# Patient Record
Sex: MALE | Race: WHITE | NOT HISPANIC OR LATINO | Employment: FULL TIME | URBAN - METROPOLITAN AREA
[De-identification: names, ages, dates, MRNs, and addresses within clinical notes are randomized per-mention and may not be internally consistent; named-entity substitution may affect disease eponyms.]

---

## 2017-01-17 ENCOUNTER — OFFICE VISIT - HEALTHEAST (OUTPATIENT)
Dept: INTERNAL MEDICINE | Facility: CLINIC | Age: 37
End: 2017-01-17

## 2017-01-17 DIAGNOSIS — J01.90 ACUTE SINUSITIS: ICD-10-CM

## 2017-01-17 DIAGNOSIS — A09 TRAVELER'S DIARRHEA: ICD-10-CM

## 2017-01-17 ASSESSMENT — MIFFLIN-ST. JEOR: SCORE: 1662.99

## 2018-11-08 ENCOUNTER — RECORDS - HEALTHEAST (OUTPATIENT)
Dept: ADMINISTRATIVE | Facility: OTHER | Age: 38
End: 2018-11-08

## 2018-12-21 ENCOUNTER — RECORDS - HEALTHEAST (OUTPATIENT)
Dept: ADMINISTRATIVE | Facility: OTHER | Age: 38
End: 2018-12-21

## 2019-07-22 ENCOUNTER — RECORDS - HEALTHEAST (OUTPATIENT)
Dept: ADMINISTRATIVE | Facility: OTHER | Age: 39
End: 2019-07-22

## 2020-10-08 ENCOUNTER — RECORDS - HEALTHEAST (OUTPATIENT)
Dept: ADMINISTRATIVE | Facility: OTHER | Age: 40
End: 2020-10-08

## 2020-11-17 ENCOUNTER — RECORDS - HEALTHEAST (OUTPATIENT)
Dept: ADMINISTRATIVE | Facility: OTHER | Age: 40
End: 2020-11-17

## 2020-12-10 ENCOUNTER — RECORDS - HEALTHEAST (OUTPATIENT)
Dept: ADMINISTRATIVE | Facility: OTHER | Age: 40
End: 2020-12-10

## 2021-02-02 ENCOUNTER — OFFICE VISIT - HEALTHEAST (OUTPATIENT)
Dept: INTERNAL MEDICINE | Facility: CLINIC | Age: 41
End: 2021-02-02

## 2021-02-02 DIAGNOSIS — Z00.00 ROUTINE GENERAL MEDICAL EXAMINATION AT A HEALTH CARE FACILITY: ICD-10-CM

## 2021-02-02 DIAGNOSIS — Z23 NEED FOR TDAP VACCINATION: ICD-10-CM

## 2021-02-02 DIAGNOSIS — S43.432S SUPERIOR GLENOID LABRUM LESION OF LEFT SHOULDER, SEQUELA: ICD-10-CM

## 2021-02-02 DIAGNOSIS — R79.89 ABNORMAL LFTS (LIVER FUNCTION TESTS): ICD-10-CM

## 2021-02-02 DIAGNOSIS — Z01.818 PREOP GENERAL PHYSICAL EXAM: ICD-10-CM

## 2021-02-02 DIAGNOSIS — K21.9 GASTROESOPHAGEAL REFLUX DISEASE WITHOUT ESOPHAGITIS: ICD-10-CM

## 2021-02-02 LAB
ALBUMIN SERPL-MCNC: 4.5 G/DL (ref 3.5–5)
ALP SERPL-CCNC: 54 U/L (ref 45–120)
ALT SERPL W P-5'-P-CCNC: 108 U/L (ref 0–45)
ANION GAP SERPL CALCULATED.3IONS-SCNC: 7 MMOL/L (ref 5–18)
AST SERPL W P-5'-P-CCNC: 38 U/L (ref 0–40)
BILIRUB SERPL-MCNC: 0.7 MG/DL (ref 0–1)
BUN SERPL-MCNC: 18 MG/DL (ref 8–22)
CALCIUM SERPL-MCNC: 9.5 MG/DL (ref 8.5–10.5)
CHLORIDE BLD-SCNC: 103 MMOL/L (ref 98–107)
CHOLEST SERPL-MCNC: 193 MG/DL
CO2 SERPL-SCNC: 29 MMOL/L (ref 22–31)
CREAT SERPL-MCNC: 1.04 MG/DL (ref 0.7–1.3)
ERYTHROCYTE [DISTWIDTH] IN BLOOD BY AUTOMATED COUNT: 11.4 % (ref 11–14.5)
FASTING STATUS PATIENT QL REPORTED: YES
GFR SERPL CREATININE-BSD FRML MDRD: >60 ML/MIN/1.73M2
GLUCOSE BLD-MCNC: 102 MG/DL (ref 70–125)
HCT VFR BLD AUTO: 46 % (ref 40–54)
HDLC SERPL-MCNC: 43 MG/DL
HGB BLD-MCNC: 16 G/DL (ref 14–18)
LDLC SERPL CALC-MCNC: 124 MG/DL
MCH RBC QN AUTO: 30.4 PG (ref 27–34)
MCHC RBC AUTO-ENTMCNC: 34.8 G/DL (ref 32–36)
MCV RBC AUTO: 88 FL (ref 80–100)
PLATELET # BLD AUTO: 196 THOU/UL (ref 140–440)
PMV BLD AUTO: 10.9 FL (ref 7–10)
POTASSIUM BLD-SCNC: 4.4 MMOL/L (ref 3.5–5)
PROT SERPL-MCNC: 7.5 G/DL (ref 6–8)
RBC # BLD AUTO: 5.26 MILL/UL (ref 4.4–6.2)
SODIUM SERPL-SCNC: 139 MMOL/L (ref 136–145)
TRIGL SERPL-MCNC: 129 MG/DL
WBC: 5.3 THOU/UL (ref 4–11)

## 2021-02-03 ENCOUNTER — COMMUNICATION - HEALTHEAST (OUTPATIENT)
Dept: INTERNAL MEDICINE | Facility: CLINIC | Age: 41
End: 2021-02-03

## 2021-02-16 ENCOUNTER — RECORDS - HEALTHEAST (OUTPATIENT)
Dept: ADMINISTRATIVE | Facility: OTHER | Age: 41
End: 2021-02-16

## 2021-03-02 ENCOUNTER — RECORDS - HEALTHEAST (OUTPATIENT)
Dept: ADMINISTRATIVE | Facility: OTHER | Age: 41
End: 2021-03-02

## 2021-03-30 ENCOUNTER — RECORDS - HEALTHEAST (OUTPATIENT)
Dept: ADMINISTRATIVE | Facility: OTHER | Age: 41
End: 2021-03-30

## 2021-04-21 ENCOUNTER — COMMUNICATION - HEALTHEAST (OUTPATIENT)
Dept: INTERNAL MEDICINE | Facility: CLINIC | Age: 41
End: 2021-04-21

## 2021-04-21 ENCOUNTER — AMBULATORY - HEALTHEAST (OUTPATIENT)
Dept: INTERNAL MEDICINE | Facility: CLINIC | Age: 41
End: 2021-04-21

## 2021-04-21 ENCOUNTER — AMBULATORY - HEALTHEAST (OUTPATIENT)
Dept: LAB | Facility: CLINIC | Age: 41
End: 2021-04-21

## 2021-04-21 DIAGNOSIS — R79.89 ABNORMAL LFTS (LIVER FUNCTION TESTS): ICD-10-CM

## 2021-04-21 LAB — ALT SERPL W P-5'-P-CCNC: 157 U/L (ref 0–45)

## 2021-04-22 ENCOUNTER — AMBULATORY - HEALTHEAST (OUTPATIENT)
Dept: LAB | Facility: CLINIC | Age: 41
End: 2021-04-22

## 2021-04-22 DIAGNOSIS — R79.89 ABNORMAL LFTS (LIVER FUNCTION TESTS): ICD-10-CM

## 2021-04-22 LAB
FERRITIN SERPL-MCNC: 427 NG/ML (ref 27–300)
IRON SATN MFR SERPL: 24 % (ref 20–50)
IRON SERPL-MCNC: 96 UG/DL (ref 42–175)
TIBC SERPL-MCNC: 396 UG/DL (ref 313–563)
TRANSFERRIN SERPL-MCNC: 317 MG/DL (ref 212–360)

## 2021-04-23 ENCOUNTER — COMMUNICATION - HEALTHEAST (OUTPATIENT)
Dept: INTERNAL MEDICINE | Facility: CLINIC | Age: 41
End: 2021-04-23

## 2021-04-23 ENCOUNTER — HOSPITAL ENCOUNTER (OUTPATIENT)
Dept: ULTRASOUND IMAGING | Facility: CLINIC | Age: 41
Discharge: HOME | End: 2021-04-23
Attending: INTERNAL MEDICINE

## 2021-04-23 DIAGNOSIS — R79.89 ABNORMAL LFTS (LIVER FUNCTION TESTS): ICD-10-CM

## 2021-04-23 LAB
HBV SURFACE AG SERPL QL IA: NEGATIVE
HCV AB SERPL QL IA: NEGATIVE

## 2021-05-04 ENCOUNTER — RECORDS - HEALTHEAST (OUTPATIENT)
Dept: ADMINISTRATIVE | Facility: OTHER | Age: 41
End: 2021-05-04

## 2021-05-30 VITALS — HEIGHT: 67 IN | BODY MASS INDEX: 27.62 KG/M2 | WEIGHT: 176 LBS

## 2021-06-05 VITALS
OXYGEN SATURATION: 96 % | WEIGHT: 183.4 LBS | SYSTOLIC BLOOD PRESSURE: 116 MMHG | TEMPERATURE: 98.9 F | HEART RATE: 84 BPM | DIASTOLIC BLOOD PRESSURE: 70 MMHG | BODY MASS INDEX: 28.94 KG/M2

## 2021-06-08 NOTE — PROGRESS NOTES
"  Office Visit - Follow Up   Liang Vázquez   36 y.o. male    Date of Visit: 1/17/2017    Chief Complaint   Patient presents with     Eye Problem     Both Eyes blood shot, felt like something in it, matted shut Monday,      Sinusitis     Head pressure, cough, yellowish phlem, started ~ 6 days ago,      Diarrhea     Was traveling and now has diarrhea        Assessment and Plan   1. Acute sinusitis  He also has associated bilateral pinkeye.  This does not need antibiotic eyedrops.  For his sinusitis will use a Z-Rolando take as directed.  Call if not improving.    2. Traveler's diarrhea  His diarrhea does seem to be resolving and is really quite mild at this time stools are just soft and not watery.  No blood.  No further treatment needed.          No Follow-up on file.     History of Present Illness   This 36 y.o. old arrived in [facility] on 1/8/2017 for a family wedding.  His sister was getting .  He was sick almost immediately upon getting there he had a fever and achiness and chills and sore throat.  The symptoms resolved but then his head became congested in his sinuses hurt.  2 days ago he noted that his right eye was red and watery.  The next morning his right eye was stuck together in his left eye was pink and watery.  Associated with this since he has been back in town over the last few days he has had some loose stools but no profound diarrhea.  No blood in his stool.  No abdominal pains.    Review of Systems: A comprehensive review of systems was negative except as noted.     Medications, Allergies and Problem List   Reviewed and updated     Physical Exam   General Appearance:       Visit Vitals     /62 (Patient Site: Left Arm, Patient Position: Sitting)     Pulse 68     Ht 5' 6.75\" (1.695 m)     Wt 176 lb (79.8 kg)     SpO2 96%     BMI 27.77 kg/m2       He is not jaundiced.  His right conjunctiva is red.  His left conjunctiva is just mildly red.  His maxillary sinuses and frontal sinuses are a " bit tender.  Mouth and throat are negative.  Neck shows no adenopathy.  Lungs are clear.       Additional Information   Current Outpatient Prescriptions   Medication Sig Dispense Refill     omeprazole (PRILOSEC) 20 MG capsule Take 20 mg by mouth daily.        azithromycin (ZITHROMAX) 250 MG tablet Take 2 tablets by mouth day one and 1 tablet by mouth days 2-5 6 tablet 0     No current facility-administered medications for this visit.      No Known Allergies  Social History   Substance Use Topics     Smoking status: Never Smoker     Smokeless tobacco: Never Used     Alcohol use 0.5 - 1.0 oz/week     1 - 2 drink(s) per week       Review and/or order of clinical lab tests:  Review and/or order of radiology tests:  Review and/or order of medicine tests:  Discussion of test results with performing physician:  Decision to obtain old records and/or obtain history from someone other than the patient:  Review and summarization of old records and/or obtaining history from someone other than the patient and.or discussion of case with another health care provider:  Independent visualization of image, tracing or specimen itself:    Time: total time spent with the patient was 15 minutes of which >50% was spent in counseling and coordination of care     Chase Martel MD

## 2021-06-14 NOTE — PATIENT INSTRUCTIONS - HE
Satisfactory preoperative medical exam for this historically very healthy 40-year-old man, who has a SLAP tear and perilabral cyst involving his left shoulder, and will undergo arthroscopic surgery at [facility] February 16, 2021.  They told her that he needs a Covid test within 5 days of the procedure, and [facility] would arrange that.     His only medication has been omeprazole, he has no allergies.  His parents are very healthy, although his dad has high blood pressure.    Since he is fasting this morning and establishing primary care, please include some health maintenance tests.    We will get comprehensive metabolic panel, lipid profile, and blood cell counts.  I will report the results to him via [employer].    He told me that he takes his omeprazole in the morning, and I told him he could take that capsule the morning of his surgery with a small sip of water, but otherwise be nothing by mouth.    We will also give him a routine tetanus booster.

## 2021-06-14 NOTE — PROGRESS NOTES
[facility]  [address] [address]  Dept: [phone]  Dept Fax: [phone]  Primary Provider: Brock Link MD  Pre-op Performing Provider: TRAM BORJA    PREOPERATIVE EVALUATION:  Today's date: 2/2/2021    Liang Vázquez is a 40 y.o. male who presents for a preoperative evaluation.    Surgical Information:  Surgery/Procedure: Left shoulder  Surgery Location: [facility]  Surgeon: Dr. Lauro Hernández  Surgery Date: 2/16/2021  Time of Surgery: Unknown  Where patient plans to recover: At home with family  Fax number for surgical facility:     Type of Anesthesia Anticipated: General    Subjective     HPI related to upcoming procedure:     Last visit to [facility] internal medicine was January 17, 2017, just over 3 years ago.  Thus this would be a New Patient visit.    Issue is a SLAP tear with what is described as a significant sized perilabral cyst left shoulder 10 x 13 mm from the posterior superior labrum, no significant rotator cuff pathology.  He said chronic and recurring flareups of left shoulder pain, November 17, 2020 had MRI arthrogram of left shoulder and did receive a subacromial injection that gave 80 to 90% relief but for less than a month.  Distribution of pain has been through the anterior chest, but as of today February 2, 2021 he is not experiencing that, and reports range of motion is pretty good.  However he may be deliberately reducing use of the shoulder, and that could put him at risk for someday getting a frozen shoulder.    He is historically a very healthy 40-year-old man, has no history of serious illnesses, recalls no previous surgery.  His only medication is omeprazole for typical gastroesophageal reflux symptoms, but he has no trouble swallowing.    Since today's visit is also to establish primary care with me (previously was seeing Dr. Yayo Link), but to take advantage of the fact that he is fasting this morning to get some  health maintenance lab tests, so therefore we will get a comprehensive metabolic panel, blood cell counts, and lipid profile.    We do not need an EKG today.  His blood pressure is excellent, he has no cardiac symptoms or history      Preop Questions 2/2/2021   Have you ever had a heart attack or stroke? No   Have you ever had surgery on your heart or blood vessels, such as a stent placement, a coronary artery bypass, or surgery on an artery in your head, neck, heart, or legs? No   Do you have chest pain with activity? No   Do you have a history of  heart failure? No   Do you currently have a cold, bronchitis or symptoms of other infection? No   Do you have a cough, shortness of breath, or wheezing? No   Do you or anyone in your family have previous history of blood clots? No   Do you or does anyone in your family have a serious bleeding problem such as prolonged bleeding following surgeries or cuts? No   Have you ever had problems with anemia or been told to take iron pills? No   Have you had any abnormal blood loss such as black, tarry or bloody stools? No   Have you ever had a blood transfusion? No   Are you willing to have a blood transfusion if it is medically needed before, during, or after your surgery? Yes   Have you or any of your relatives ever had problems with anesthesia? No   Do you have sleep apnea, excessive snoring or daytime drowsiness? No   Do you have any artifical heart valves or other implanted medical devices like a pacemaker, defibrillator, or continuous glucose monitor? No   Do you have artificial joints? No   Are you allergic to latex? No       Review of Systems  Constitutional, neuro, ENT, endocrine, pulmonary, cardiac, gastrointestinal, genitourinary, musculoskeletal, integument and psychiatric systems are negative, except as otherwise noted.      Patient Active Problem List    Diagnosis Date Noted     Esophageal Reflux      No past medical history on file.  History reviewed. No pertinent  surgical history.  Current Outpatient Medications   Medication Sig Dispense Refill     omeprazole (PRILOSEC) 20 MG capsule Take 20 mg by mouth daily.        No current facility-administered medications for this visit.        No Known Allergies    Social History     Tobacco Use     Smoking status: Never Smoker     Smokeless tobacco: Never Used   Substance Use Topics     Alcohol use: Yes     Alcohol/week: 0.8 - 1.7 standard drinks     Types: 1 - 2 drink(s) per week      Family History   Problem Relation Age of Onset     No Medical Problems Mother      Hypertension Father      Social History     Substance and Sexual Activity   Drug Use No        Objective     /70 (Patient Site: Right Arm, Patient Position: Sitting, Cuff Size: Adult Large)   Pulse 84   Temp 98.9  F (37.2  C) (Oral)   Wt 183 lb 6.4 oz (83.2 kg)   SpO2 96%   BMI 28.94 kg/m    Physical Exam    General: Alert, in no distress  Skin: + Scattered benign-appearing moles, especially on his back, also acne on his back.  Large skin tag on his right upper biceps area, which he says has been present for years.  Eyes/nose/throat: Eyes without scleral icterus, eye movements normal, pupils equal and reactive, oropharynx clear  MSK: Neck with good ROM  Lymphatic: Neck without adenopathy or masses  Endocrine: Thyroid with no nodules to palpation  Pulm: Lungs clear to auscultation bilaterally  Cardiac: Heart with regular rate and rhythm, no murmur or gallop  GI: Abdomen soft, nontender. No palpable enlargement of liver or spleen  MSK: Extremities no tenderness or edema  Good range of motion of both shoulders  Neuro: Moves all extremities, w skilled ithout focal weakness  Psych: Alert, normal mental status. Normal affect and speech    No results for input(s): HGB, PLT, INR, NA, K, CREATININE, HBA1C in the last [id number] hours.     PRE-OP Diagnostics:   Recent Results (from the past 48 hour(s))   Comprehensive Metabolic Panel    Collection Time: 02/02/21  8:38 AM    Result Value Ref Range    Sodium 139 136 - 145 mmol/L    Potassium 4.4 3.5 - 5.0 mmol/L    Chloride 103 98 - 107 mmol/L    CO2 29 22 - 31 mmol/L    Anion Gap, Calculation 7 5 - 18 mmol/L    Glucose 102 70 - 125 mg/dL    BUN 18 8 - 22 mg/dL    Creatinine 1.04 0.70 - 1.30 mg/dL    GFR MDRD Af Amer >60 >60 mL/min/1.73m2    GFR MDRD Non Af Amer >60 >60 mL/min/1.73m2    Bilirubin, Total 0.7 0.0 - 1.0 mg/dL    Calcium 9.5 8.5 - 10.5 mg/dL    Protein, Total 7.5 6.0 - 8.0 g/dL    Albumin 4.5 3.5 - 5.0 g/dL    Alkaline Phosphatase 54 45 - 120 U/L    AST 38 0 - 40 U/L     (H) 0 - 45 U/L   HM2(CBC w/o Differential)    Collection Time: 02/02/21  8:38 AM   Result Value Ref Range    WBC 5.3 4.0 - 11.0 thou/uL    RBC 5.26 4.40 - 6.20 mill/uL    Hemoglobin 16.0 14.0 - 18.0 g/dL    Hematocrit 46.0 40.0 - 54.0 %    MCV 88 80 - 100 fL    MCH 30.4 27.0 - 34.0 pg    MCHC 34.8 32.0 - 36.0 g/dL    RDW 11.4 11.0 - 14.5 %    Platelets 196 140 - 440 thou/uL    MPV 10.9 (H) 7.0 - 10.0 fL   Lipid Cascade    Collection Time: 02/02/21  8:38 AM   Result Value Ref Range    Cholesterol 193 <=199 mg/dL    Triglycerides 129 <=149 mg/dL    HDL Cholesterol 43 >=40 mg/dL    LDL Calculated 124 <=129 mg/dL    Patient Fasting > 8hrs? Yes      No EKG required, no history of coronary heart disease, significant arrhythmia, peripheral arterial disease or other structural heart disease.    REVISED CARDIAC RISK INDEX (RCRI)   The patient has the following serious cardiovascular risks for perioperative complications:   - No serious cardiac risks = 0 points    RCRI INTERPRETATION: 0 points: Class I (very low risk - 0.4% complication rate)         Assessment & Plan      The proposed surgical procedure is considered LOW risk.      Satisfactory preoperative medical exam for this historically very healthy 40-year-old man, who has a SLAP tear and perilabral cyst involving his left shoulder, and will undergo arthroscopic surgery at [facility] February  16, 2021.  They told her that he needs a Covid test within 5 days of the procedure, and [facility] would arrange that.     His only medication has been omeprazole, he has no allergies.  His parents are very healthy, although his dad has high blood pressure.    Since he is fasting this morning and establishing primary care, please include some health maintenance tests.    We will get comprehensive metabolic panel, lipid profile, and blood cell counts.  I will report the results to him via Q-got.    LATER: Preop labs are satisfactory, although there is an issue of  Elevated ALT of 108, normal AST, alkaline phosphatase, and bilirubin.  I will inform him via MyChart Note to recheck this in 3 months, but reduce alcohol, try to lose a few pounds    CBC within normal limits.  Lipid profile reasonably good.    He told me that he takes his omeprazole in the morning, and I told him he could take that capsule the morning of his surgery with a small sip of water, but otherwise be nothing by mouth.    We will also give him a routine tetanus booster.    Acne and benign-appearing moles on the back, skin tag right biceps area      RECOMMENDATION:  APPROVAL GIVEN to proceed with proposed procedure, without further diagnostic evaluation.    Signed Electronically by: Merlin Delatorre MD    Copy of this evaluation report is provided to requesting physician.    [facility] Pre[facility] Guidelines    Revised Cardiac Risk Index

## 2021-06-16 PROBLEM — S43.439A SLAP TEAR OF SHOULDER: Status: ACTIVE | Noted: 2021-02-02

## 2021-06-16 PROBLEM — K76.0 FATTY LIVER: Status: ACTIVE | Noted: 2021-04-23

## 2021-06-16 PROBLEM — R79.89 ABNORMAL LFTS (LIVER FUNCTION TESTS): Status: ACTIVE | Noted: 2021-02-03

## 2021-06-26 ENCOUNTER — HEALTH MAINTENANCE LETTER (OUTPATIENT)
Age: 41
End: 2021-06-26

## 2021-07-20 ENCOUNTER — TRANSFERRED RECORDS (OUTPATIENT)
Dept: HEALTH INFORMATION MANAGEMENT | Facility: CLINIC | Age: 41
End: 2021-07-20

## 2021-08-03 ENCOUNTER — TRANSFERRED RECORDS (OUTPATIENT)
Dept: HEALTH INFORMATION MANAGEMENT | Facility: CLINIC | Age: 41
End: 2021-08-03

## 2021-10-16 ENCOUNTER — HEALTH MAINTENANCE LETTER (OUTPATIENT)
Age: 41
End: 2021-10-16

## 2021-10-20 ENCOUNTER — LAB (OUTPATIENT)
Dept: LAB | Facility: CLINIC | Age: 41
End: 2021-10-20
Payer: COMMERCIAL

## 2021-10-20 DIAGNOSIS — R94.5 ABNORMAL RESULTS OF LIVER FUNCTION STUDIES: ICD-10-CM

## 2021-10-20 LAB
ALBUMIN SERPL-MCNC: 4.5 G/DL (ref 3.5–5)
ALP SERPL-CCNC: 57 U/L (ref 45–120)
ALT SERPL W P-5'-P-CCNC: 41 U/L (ref 0–45)
ANION GAP SERPL CALCULATED.3IONS-SCNC: 11 MMOL/L (ref 5–18)
AST SERPL W P-5'-P-CCNC: 20 U/L (ref 0–40)
BILIRUB SERPL-MCNC: 0.8 MG/DL (ref 0–1)
BUN SERPL-MCNC: 20 MG/DL (ref 8–22)
CALCIUM SERPL-MCNC: 10 MG/DL (ref 8.5–10.5)
CHLORIDE BLD-SCNC: 104 MMOL/L (ref 98–107)
CO2 SERPL-SCNC: 27 MMOL/L (ref 22–31)
CREAT SERPL-MCNC: 0.95 MG/DL (ref 0.7–1.3)
FERRITIN SERPL-MCNC: 135 NG/ML (ref 27–300)
GFR SERPL CREATININE-BSD FRML MDRD: >90 ML/MIN/1.73M2
GLUCOSE BLD-MCNC: 94 MG/DL (ref 70–125)
POTASSIUM BLD-SCNC: 4.9 MMOL/L (ref 3.5–5)
PROT SERPL-MCNC: 7.5 G/DL (ref 6–8)
SODIUM SERPL-SCNC: 142 MMOL/L (ref 136–145)

## 2021-10-20 PROCEDURE — 36415 COLL VENOUS BLD VENIPUNCTURE: CPT

## 2021-10-20 PROCEDURE — 80053 COMPREHEN METABOLIC PANEL: CPT

## 2021-10-20 PROCEDURE — 82728 ASSAY OF FERRITIN: CPT

## 2022-07-23 ENCOUNTER — HEALTH MAINTENANCE LETTER (OUTPATIENT)
Age: 42
End: 2022-07-23

## 2022-09-20 ENCOUNTER — TRANSFERRED RECORDS (OUTPATIENT)
Dept: HEALTH INFORMATION MANAGEMENT | Facility: CLINIC | Age: 42
End: 2022-09-20

## 2022-10-01 ENCOUNTER — HEALTH MAINTENANCE LETTER (OUTPATIENT)
Age: 42
End: 2022-10-01

## 2022-10-26 ENCOUNTER — OFFICE VISIT (OUTPATIENT)
Dept: INTERNAL MEDICINE | Facility: CLINIC | Age: 42
End: 2022-10-26
Payer: COMMERCIAL

## 2022-10-26 VITALS
OXYGEN SATURATION: 96 % | DIASTOLIC BLOOD PRESSURE: 82 MMHG | HEART RATE: 82 BPM | SYSTOLIC BLOOD PRESSURE: 110 MMHG | BODY MASS INDEX: 27.62 KG/M2 | HEIGHT: 67 IN | WEIGHT: 176 LBS

## 2022-10-26 DIAGNOSIS — Z13.220 SCREENING FOR HYPERLIPIDEMIA: ICD-10-CM

## 2022-10-26 DIAGNOSIS — Z23 HIGH PRIORITY FOR COVID-19 VACCINATION: ICD-10-CM

## 2022-10-26 DIAGNOSIS — Z23 INFLUENZA VACCINATION ADMINISTERED AT CURRENT VISIT: ICD-10-CM

## 2022-10-26 DIAGNOSIS — Z00.00 ROUTINE GENERAL MEDICAL EXAMINATION AT A HEALTH CARE FACILITY: Primary | ICD-10-CM

## 2022-10-26 DIAGNOSIS — Z13.1 SCREENING FOR DIABETES MELLITUS: ICD-10-CM

## 2022-10-26 PROCEDURE — 99396 PREV VISIT EST AGE 40-64: CPT | Mod: 25 | Performed by: INTERNAL MEDICINE

## 2022-10-26 PROCEDURE — 0124A COVID-19,PF,PFIZER BOOSTER BIVALENT: CPT | Performed by: INTERNAL MEDICINE

## 2022-10-26 PROCEDURE — 90471 IMMUNIZATION ADMIN: CPT | Performed by: INTERNAL MEDICINE

## 2022-10-26 PROCEDURE — 90686 IIV4 VACC NO PRSV 0.5 ML IM: CPT | Performed by: INTERNAL MEDICINE

## 2022-10-26 PROCEDURE — 91312 COVID-19,PF,PFIZER BOOSTER BIVALENT: CPT | Performed by: INTERNAL MEDICINE

## 2022-10-26 ASSESSMENT — ENCOUNTER SYMPTOMS
DIZZINESS: 0
DIARRHEA: 0
SHORTNESS OF BREATH: 0
HEMATURIA: 0
HEADACHES: 0
COUGH: 0
SORE THROAT: 0
CHILLS: 0
ABDOMINAL PAIN: 0
MYALGIAS: 0
HEMATOCHEZIA: 0
WEAKNESS: 0
PALPITATIONS: 0
NAUSEA: 0
JOINT SWELLING: 0
FEVER: 0
FREQUENCY: 0
EYE PAIN: 0
HEARTBURN: 0
PARESTHESIAS: 0
CONSTIPATION: 0
DYSURIA: 0
NERVOUS/ANXIOUS: 0
ARTHRALGIAS: 0

## 2022-10-26 NOTE — PROGRESS NOTES
SUBJECTIVE:   CC: Esteban is an 42 year old who presents for preventative health visit.       Patient has been advised of split billing requirements and indicates understanding: Yes  Healthy Habits:     Getting at least 3 servings of Calcium per day:  NO    Bi-annual eye exam:  NO    Dental care twice a year:  Yes    Sleep apnea or symptoms of sleep apnea:  None    Diet:  Regular (no restrictions)    Frequency of exercise:  None    Taking medications regularly:  Yes    Medication side effects:  None    PHQ-2 Total Score: 0    Additional concerns today:  No      Today's PHQ-2 Score:   PHQ-2 ( 1999 Pfizer) 10/26/2022   Q1: Little interest or pleasure in doing things 0   Q2: Feeling down, depressed or hopeless 0   PHQ-2 Score 0   Q1: Little interest or pleasure in doing things Not at all   Q2: Feeling down, depressed or hopeless Not at all   PHQ-2 Score 0       Abuse: Current or Past(Physical, Sexual or Emotional)- NA  Do you feel safe in your environment? Yes    Have you ever done Advance Care Planning? (For example, a Health Directive, POLST, or a discussion with a medical provider or your loved ones about your wishes): No, advance care planning information given to patient to review.  Patient declined advance care planning discussion at this time.    Social History     Tobacco Use     Smoking status: Never     Smokeless tobacco: Never   Substance Use Topics     Alcohol use: Yes     Alcohol/week: 0.8 - 1.7 standard drinks         Alcohol Use 10/26/2022   Prescreen: >3 drinks/day or >7 drinks/week? No       Last PSA: No results found for: PSA    Reviewed orders with patient. Reviewed health maintenance and updated orders accordingly -       Reviewed and updated as needed this visit by clinical staff   Tobacco  Allergies  Meds              Reviewed and updated as needed this visit by Provider                     Review of Systems   Constitutional: Negative for chills and fever.   HENT: Negative for congestion, ear  "pain, hearing loss and sore throat.    Eyes: Negative for pain and visual disturbance.   Respiratory: Negative for cough and shortness of breath.    Cardiovascular: Negative for chest pain, palpitations and peripheral edema.   Gastrointestinal: Negative for abdominal pain, constipation, diarrhea, heartburn, hematochezia and nausea.   Genitourinary: Negative for dysuria, frequency, genital sores, hematuria, impotence, penile discharge and urgency.   Musculoskeletal: Negative for arthralgias, joint swelling and myalgias.   Skin: Negative for rash.   Neurological: Negative for dizziness, weakness, headaches and paresthesias.   Psychiatric/Behavioral: Negative for mood changes. The patient is not nervous/anxious.      OBJECTIVE:   /82   Pulse 82   Ht 1.695 m (5' 6.75\")   Wt 79.8 kg (176 lb)   SpO2 96%   BMI 27.77 kg/m      Physical Exam    General: Alert, in no distress  Skin: + Scattered benign-appearing moles on his back, scattered acne pustules.  Eyes/nose/throat: Eyes without scleral icterus, eye movements normal, pupils equal and reactive, oropharynx clear, ears with normal TM's  MSK: Neck with good ROM  Lymphatic: Neck without adenopathy or masses  Endocrine: Thyroid with no nodules to palpation  Pulm: Lungs clear to auscultation bilaterally  Cardiac: Heart with regular rate and rhythm, no murmur or gallop  GI: Abdomen soft, nontender. No palpable enlargement of liver or spleen  MSK: Extremities no tenderness or edema  Neuro: Moves all extremities, without focal weakness  Psych: Alert, normal mental status. Normal affect and speech    ASSESSMENT/PLAN:     Annual preventive exam, my last visit with Esteban was February 2021, and has been doing well.    He was pleased to report that his left shoulder is doing really well these days, and he can do all of his work and recreational activities, and would like to get back to the gym.    He is going to come in for fasting blood test at a future morning, at which " time check his lipid panel, comprehensive metabolic panel which includes liver chemistry test, blood cell counts, A1c test for diabetes  Today October 26, 2022 we will give him Pfizer bivalent booster and also seasonal flu shot    At age 42 probably does not need a PSA prostate cancer blood screening test.    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  I reminded him that the omeprazole does not actually stop the mechanism of reflux, it merely shifts the stomach contents pH to be less acidic.  I reminded him of the importance of being sure the stomach is pretty much empty before he lies down.  Therefore leave approximately 90 minutes, probably longer, after meal before lying down    History of a elevated ALT liver enzyme in February 2021, which then subsequently normalized, and we will check that again on upcoming metabolic panel.  He told me that alcohol consumption is approximately couple drinks a week.    Overweight by body mass index criteria, 27.8, ideal would be more like 25  I reminded Esteban importance of eating slowly, controlling portion size, and identifying problem foods to reduce or even eliminate such as starches, sweets, and fried foods.  Alcoholic beverages tend to bring carbohydrate calories  Wt Readings from Last 5 Encounters:   10/26/22 79.8 kg (176 lb)   02/02/21 83.2 kg (183 lb 6.4 oz)   01/17/17 79.8 kg (176 lb)   12/27/16 78.5 kg (173 lb)   02/03/16 80.8 kg (178 lb 1.3 oz)     Good blood pressure  BP Readings from Last 6 Encounters:   10/26/22 110/82   02/02/21 116/70     Lipid panel in February 2021 sort just okay, because his HDL good cholesterol is a bit on the low side at 43, would like to see greater than  "50    2-2-2021  Cholesterol <=199 mg/dL 193      Triglycerides <=149 mg/dL 129    Direct Measure HDL >=40 mg/dL 43    LDL Cholesterol Calculated <=129 mg/dL 124      Acne and benign-appearing moles on the back, skin tag right biceps area    Future preventive screening tests  I told Esteban that at age 45, could consider starting prostate cancer screening with annual PSA blood tests  Colon cancer screening could start at age 45.  Options include stool based test such as Cologuard, or using colonoscopy.    Has avoided COVID illness, as far as he knows  Would like a Pfizer booster and flu shot    Immunization History   Administered Date(s) Administered     COVID-19,PF,Pfizer (12+ Yrs) 03/18/2021, 04/08/2021     Tdap (Adacel,Boostrix) 02/02/2021       COUNSELING:   Reviewed preventive health counseling, as reflected in patient instructions       Healthy diet/nutrition    Estimated body mass index is 27.77 kg/m  as calculated from the following:    Height as of this encounter: 1.695 m (5' 6.75\").    Weight as of this encounter: 79.8 kg (176 lb).         He reports that he has never smoked. He has never used smokeless tobacco.        TRAM BORJA MD  [facility]  "

## 2022-10-26 NOTE — PATIENT INSTRUCTIONS
Annual preventive exam, my last visit with Esteban was February 2021, and has been doing well.    He was pleased to report that his left shoulder is doing really well these days, and he can do all of his work and recreational activities, and would like to get back to the gym.    He is going to come in for fasting blood test at a future morning, at which time check his lipid panel, comprehensive metabolic panel which includes liver chemistry test, blood cell counts, A1c test for diabetes  Today October 26, 2022 we will give him Pfizer bivalent booster and also seasonal flu shot    At age 42 probably does not need a PSA prostate cancer blood screening test.    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  I reminded him that the omeprazole does not actually stop the mechanism of reflux, it merely shifts the stomach contents pH to be less acidic.  I reminded him of the importance of being sure the stomach is pretty much empty before he lies down.  Therefore leave approximately 90 minutes, probably longer, after meal before lying down    History of a elevated ALT liver enzyme in February 2021, which then subsequently normalized, and we will check that again on upcoming metabolic panel.  He told me that alcohol consumption is approximately couple drinks a week.    Overweight by body mass index criteria, 27.8, ideal would be more like 25  I reminded Esteban importance of eating slowly, controlling portion size, and identifying problem foods to reduce or even eliminate such as starches, sweets, and fried foods.  Alcoholic beverages tend to bring carbohydrate calories  Wt Readings from Last 5 Encounters:   10/26/22 79.8 kg (176 lb)   02/02/21 83.2 kg (183 lb 6.4 oz)    01/17/17 79.8 kg (176 lb)   12/27/16 78.5 kg (173 lb)   02/03/16 80.8 kg (178 lb 1.3 oz)     Good blood pressure  BP Readings from Last 6 Encounters:   10/26/22 110/82   02/02/21 116/70     Lipid panel in February 2021 sort just okay, because his HDL good cholesterol is a bit on the low side at 43, would like to see greater than 50    2-2-2021  Cholesterol <=199 mg/dL 193      Triglycerides <=149 mg/dL 129    Direct Measure HDL >=40 mg/dL 43    LDL Cholesterol Calculated <=129 mg/dL 124      Acne and benign-appearing moles on the back, skin tag right biceps area    Future preventive screening tests  I told Esteban that at age 45, could consider starting prostate cancer screening with annual PSA blood tests  Colon cancer screening could start at age 45.  Options include stool based test such as Cologuard, or using colonoscopy.    Has avoided COVID illness, as far as he knows  Would like a Pfizer booster and flu shot      Immunization History   Administered Date(s) Administered    COVID-19,PF,Pfizer (12+ Yrs) 03/18/2021, 04/08/2021    Tdap (Adacel,Boostrix) 02/02/2021

## 2022-11-03 ENCOUNTER — LAB (OUTPATIENT)
Dept: LAB | Facility: CLINIC | Age: 42
End: 2022-11-03
Payer: COMMERCIAL

## 2022-11-03 DIAGNOSIS — Z13.220 SCREENING FOR HYPERLIPIDEMIA: ICD-10-CM

## 2022-11-03 DIAGNOSIS — Z00.00 ROUTINE GENERAL MEDICAL EXAMINATION AT A HEALTH CARE FACILITY: ICD-10-CM

## 2022-11-03 DIAGNOSIS — Z13.1 SCREENING FOR DIABETES MELLITUS: ICD-10-CM

## 2022-11-03 LAB
ALBUMIN SERPL BCG-MCNC: 4.8 G/DL (ref 3.5–5.2)
ALP SERPL-CCNC: 56 U/L (ref 40–129)
ALT SERPL W P-5'-P-CCNC: 59 U/L (ref 10–50)
ANION GAP SERPL CALCULATED.3IONS-SCNC: 12 MMOL/L (ref 7–15)
AST SERPL W P-5'-P-CCNC: 38 U/L (ref 10–50)
BILIRUB SERPL-MCNC: 0.6 MG/DL
BUN SERPL-MCNC: 26 MG/DL (ref 6–20)
CALCIUM SERPL-MCNC: 9.5 MG/DL (ref 8.6–10)
CHLORIDE SERPL-SCNC: 101 MMOL/L (ref 98–107)
CHOLEST SERPL-MCNC: 207 MG/DL
CREAT SERPL-MCNC: 1.1 MG/DL (ref 0.67–1.17)
DEPRECATED HCO3 PLAS-SCNC: 25 MMOL/L (ref 22–29)
ERYTHROCYTE [DISTWIDTH] IN BLOOD BY AUTOMATED COUNT: 11.6 % (ref 10–15)
GFR SERPL CREATININE-BSD FRML MDRD: 86 ML/MIN/1.73M2
GLUCOSE SERPL-MCNC: 97 MG/DL (ref 70–99)
HBA1C MFR BLD: 5.2 % (ref 0–5.6)
HCT VFR BLD AUTO: 47.8 % (ref 40–53)
HDLC SERPL-MCNC: 41 MG/DL
HGB BLD-MCNC: 16.7 G/DL (ref 13.3–17.7)
LDLC SERPL CALC-MCNC: 134 MG/DL
MCH RBC QN AUTO: 30.6 PG (ref 26.5–33)
MCHC RBC AUTO-ENTMCNC: 34.9 G/DL (ref 31.5–36.5)
MCV RBC AUTO: 88 FL (ref 78–100)
NONHDLC SERPL-MCNC: 166 MG/DL
PLATELET # BLD AUTO: 215 10E3/UL (ref 150–450)
POTASSIUM SERPL-SCNC: 4.1 MMOL/L (ref 3.4–5.3)
PROT SERPL-MCNC: 7.9 G/DL (ref 6.4–8.3)
RBC # BLD AUTO: 5.45 10E6/UL (ref 4.4–5.9)
SODIUM SERPL-SCNC: 138 MMOL/L (ref 136–145)
TRIGL SERPL-MCNC: 159 MG/DL
WBC # BLD AUTO: 6.2 10E3/UL (ref 4–11)

## 2022-11-03 PROCEDURE — 83036 HEMOGLOBIN GLYCOSYLATED A1C: CPT

## 2022-11-03 PROCEDURE — 36415 COLL VENOUS BLD VENIPUNCTURE: CPT

## 2022-11-03 PROCEDURE — 85027 COMPLETE CBC AUTOMATED: CPT

## 2022-11-03 PROCEDURE — 80053 COMPREHEN METABOLIC PANEL: CPT

## 2022-11-03 PROCEDURE — 80061 LIPID PANEL: CPT

## 2023-05-30 ENCOUNTER — OFFICE VISIT (OUTPATIENT)
Dept: PODIATRY | Facility: CLINIC | Age: 43
End: 2023-05-30
Payer: COMMERCIAL

## 2023-05-30 VITALS
SYSTOLIC BLOOD PRESSURE: 121 MMHG | HEART RATE: 77 BPM | OXYGEN SATURATION: 98 % | WEIGHT: 173 LBS | BODY MASS INDEX: 27.3 KG/M2 | DIASTOLIC BLOOD PRESSURE: 64 MMHG

## 2023-05-30 DIAGNOSIS — G57.62 MORTON'S NEUROMA OF LEFT FOOT: Primary | ICD-10-CM

## 2023-05-30 PROCEDURE — 99204 OFFICE O/P NEW MOD 45 MIN: CPT | Performed by: PODIATRIST

## 2023-05-30 NOTE — PROGRESS NOTES
FOOT AND ANKLE SURGERY/PODIATRY CONSULT NOTE         ASSESSMENT:   Bennett's neuroma left foot      TREATMENT:  I have recommended surgical excision of the symptomatic Bennett's neuroma left foot.  The patient was informed that the procedure will be done on an outpatient basis under local anesthesia with IV sedation.  He was told the procedure takes approximately 10 minutes to perform.  He will then be discharged weightbearing in a postop shoe for 2 weeks.  The patient was asked to go n.p.o. at midnight prior to the procedure and he was asked to see his primary care physician for preoperative consultation.  The patient was told he would have some permanent numbness between the third and fourth toes as a result of this procedure.  All pertinent questions were invited and answered.        HPI:Liang Vázquez presented to the clinic today complaining of severe pain involving the fourth toe left foot.  The patient indicated that he has had this pain for several weeks.  The patient stated the pain is aggravated with weightbearing and ambulation.  He continues to have some pain even while resting.  The pain is quite severe.  He denies trauma to the foot.  He has not had any associated redness or swelling.  He denies any other previous treatment.     History reviewed. No pertinent past medical history.    Social History     Socioeconomic History     Marital status:      Spouse name: Not on file     Number of children: Not on file     Years of education: Not on file     Highest education level: Not on file   Occupational History     Not on file   Tobacco Use     Smoking status: Never     Smokeless tobacco: Never   Vaping Use     Vaping status: Not on file   Substance and Sexual Activity     Alcohol use: Yes     Alcohol/week: 0.8 - 1.7 standard drinks of alcohol     Drug use: No     Sexual activity: Not on file   Other Topics Concern     Not on file   Social History Narrative     Not on file     Social Determinants of  Health     Financial Resource Strain: Not on file   Food Insecurity: Not on file   Transportation Needs: Not on file   Physical Activity: Not on file   Stress: Not on file   Social Connections: Not on file   Intimate Partner Violence: Not on file   Housing Stability: Not on file        No Known Allergies       Current Outpatient Medications:      omeprazole (PRILOSEC) 20 MG DR capsule, Take 1 capsule (20 mg) by mouth daily, Disp: 90 capsule, Rfl: 3     Family History   Problem Relation Age of Onset     No Known Problems Mother      Hypertension Father         Social History     Socioeconomic History     Marital status:      Spouse name: Not on file     Number of children: Not on file     Years of education: Not on file     Highest education level: Not on file   Occupational History     Not on file   Tobacco Use     Smoking status: Never     Smokeless tobacco: Never   Vaping Use     Vaping status: Not on file   Substance and Sexual Activity     Alcohol use: Yes     Alcohol/week: 0.8 - 1.7 standard drinks of alcohol     Drug use: No     Sexual activity: Not on file   Other Topics Concern     Not on file   Social History Narrative     Not on file     Social Determinants of Health     Financial Resource Strain: Not on file   Food Insecurity: Not on file   Transportation Needs: Not on file   Physical Activity: Not on file   Stress: Not on file   Social Connections: Not on file   Intimate Partner Violence: Not on file   Housing Stability: Not on file        Review of Systems - Patient denies fever, chills, rash, wound, stiffness, limping, numbness, weakness, heart burn, blood in stool, chest pain with activity, calf pain when walking, shortness of breath with activity, chronic cough, easy bleeding/bruising, swelling of ankles, excessive thirst, fatigue, depression, anxiety.  Patient admits to painful fourth toe left foot.      OBJECTIVE:  Appearance: alert, well appearing, and in no distress.    /64   Pulse  77   Wt 78.5 kg (173 lb)   SpO2 98%   BMI 27.30 kg/m       Body mass index is 27.3 kg/m .     General appearance: Patient is alert and fully cooperative with history & exam.  No sign of distress is noted during the visit.  Psychiatric: Affect is pleasant & appropriate.  Patient appears motivated to improve health.  Respiratory: Breathing is regular & unlabored while sitting.  HEENT: Hearing is intact to spoken word.  Speech is clear.  No gross evidence of visual impairment that would impact ambulation.    Vascular: Dorsalis pedis and posterior tibial pulses are palpable. There is pedal hair growth bilaterally.  CFT < 3 sec from anterior tibial surface to distal digits bilaterally. There is no appreciable edema noted.  Dermatologic: Turgor and texture are within normal limits. No coloration or temperature changes. No primary or secondary lesions noted.  Neurologic: All epicritic and proprioceptive sensations are grossly intact bilaterally.  There is a very pronounced positive Yong sign noted third intermetatarsal space left foot.  Musculoskeletal: All active and passive ankle, subtalar, midtarsal, and 1st MPJ range of motion are grossly intact without pain or crepitus, with the exception of none. Manual muscle strength is within normal limits bilaterally. All dorsiflexors, plantarflexors, invertors, evertors are intact bilaterally. Tenderness present to third intermetatarsal space left foot on palpation.  No tenderness to the left foot with range of motion. Calf is soft/non-tender without warmth/induration    Imaging:       No images are attached to the encounter or orders placed in the encounter.     No results found.   No results found.         Andrea Langley DPM  [facility] Foot & Ankle Surgery/Podiatry

## 2023-05-30 NOTE — PATIENT INSTRUCTIONS
Liang,      Your surgery with [facility] has been ordered. A  will contact you in the next few days to schedule. Or feel free to call [phone] to schedule sooner.     Procedure:   Excision  Bennett's neuroma left foot.     Procedure Date :   A surgery nurse will call you 1-2 days before surgery to inform you of the time of arrival for surgery.    Surgeon:   MD Andrea Argueta    Admission Type:   Outpatient    Procedure Location:   [facility]:  [address] [place] [address] [address] [place] [address] (Fax: [phone])    Post Operative Appointment:      Preparation Instructions to complete:    []  You will need a Pre-op Physical within 30 days before surgery with your primary care provider. This exam is required by the anesthesiologist to ensure a safe surgical experience.    Failure  to obtain your pre-op physical will lead to cancellation of your surgery  Call them right away to schedule this. Please ensure your Preoperative Physical is faxed to the Hospital (numbers listed above)    [] Preoperative Medication Instructions  We would like you to stop your anticoagulation medications 3-5 days before surgery HOWEVER contact your prescribing provider for instructions before discontinuing any medications. (Examples: Coumadin, Plavix, Xarelto, Eliquis, Pradaxa, Effient, Brilinta) If you are on Coumadin, we would like the goal INR ? 1.2.  IT IS OK TO STAY ON ASPIRIN PRIOR TO SURGERY.   Hold Ibuprofen, Herbal Supplements and Vitamin E 7 days before  Stop Cialis, Levitra and Viagra 2-3 days before surgery    [] Fasting Requirements  Nothing to eat for 8 hours before surgery unless instructed differently by the surgery nurse.  You may have clear liquids up to two hours before your arrival time (coffee, tea, water, or Gatorade. No cream or milk)  If your primary care provider has instructed you to continue oral medications, you may take them on the morning of surgery with a  small sip of water.    No alcohol or smoking after 12:00am the day of surgery    [] Contact your insurance regarding coverage  If you would like a Good [place] Estimate for your upcoming procedure at [facility] Location, contact Cost of Care Estimates   Advocates are available Monday through Friday 8am - 5pm   [phone]  You may also submit a request online through [employer]    For all self-pay, estimate, or anesthesia billing questions at [facility], the contact information is below:    Who to contact: Oneyda HARTLEY  [facility]hesia Network number: [phone]  Prepay number: [phone]    [] DO NOT BRING FMLA WITH TO SURGERY.  These should be sent to the provider's office by fax to [phone].    [] Day of Surgery  Medications - Take as indicated with sips of water.   Wear comfortable loose fitting clothes. Wear your glasses-Not contacts. Do not wear jewelry and remove body piercing's. Surgery may be cancelled if they are not removed.   You may have 1 family member wait in the lobby during your surgery. Visitor restrictions are subject to change. Please verify with the surgery nurse when they call.   If same day surgery-Have a someone come with you to surgery that can help you understand the surgeon's instructions, drive you home and stay with you overnight the first night.    [] You will receive a call from a surgery nurse 1-3 days prior to surgery. They will go over more details with you.       Frequently Asked Questions    WHAT DO I DO WHEN I COME HOME FROM SURGERY?    After surgery and/ or your hospital stay you may be tired and drowsy. Rest, but make sure to get up and walk around every couple of hours to circulate your blood. If you are non-weight bearing do not put any weight on your foot.  Be sure to take your medications as directed. Try to get a restful sleep and begin more normal activities as tolerated.    HOW MUCH PAIN SHOULD I EXPECT AND WILL I HAVE PAIN  MEDICATION?    Everyone tolerates pain differently. Still, each type of surgery involves a certain level and type of pain. Generally most people feel better within a few days but keep in mind that everyone has a different tolerance to pain. All medications should be taken as directed. All medications can have side effects such as bleeding, upset stomach, headaches, dizziness, constipation, etc. If you have any major problems or allergic reaction, stop the medication and call the office. If you only have a little pain, you may simply take Tylenol or Ibuprofen as directed on the label.     WHAT ABOUT MY DRESSING AND WHEN DO I COME BACK TO THE OFFICE?    The outer dressing stays in place for 7 days and when you come in for your first post-op we will remove it.  Some patients may have staples, zipper or sutures; these stay in for 10-14 days and will be removed at your 2nd post-op visit. After 24 hours you may shower using shower precautions.    WHAT ABOUT SWELLING, REDNESS, BRUISING OR DRAINAGE?    It is normal to have some swelling, and bruising after surgery. It gradually subsides but may be present for several weeks. Elevation and icing will help to reduce the swelling more rapidly.  If your bandage is really tight after 24 hours you may cut the bandage an inch by the toes or under your foot and by your ankle.  Ice therapy often works better than oral pain medication. Using cold therapy is recommended every hour for 20 minutes.    WHEN CAN I TAKE A BATH?    You can take a bath as long as the wound has no drainage and is fully healed without a scab. This generally takes about 2 weeks.  Unless you get the bandaged protector from above then you can take a shower when you feel up to it.    WHEN CAN I RETURN TO WORK?    You may return to work to an office-type job whenever you feel comfortable enough. The only restriction would be your own motivation and pain tolerance. If your job requires vigorous activities you may be  off 1-4 weeks which is determined by what surgery you have and your operating physician.     WHAT ABOUT DRIVING OR FLYING?    As a general rule, you may resume driving as soon as you are comfortable and fully alert and off narcotic pain medications. If you are traveling by plane within 4 weeks of surgery, perform range of motion exercises of the legs and feet during the flight. Get up and walk around frequently to prevent blood clots.      WHEN CAN I EXERSICE?    You may return to normal activities such as exercising when your surgeon tells you usually 2 weeks. Walking, sitting, standing and going up the stairs are all fine after the 2-week linden. You may have restrictions for 1-4 weeks of no lifting, pushing, pulling in excess of 20 lbs. This is determined by what surgery you have and your surgeon.    WILL I BECOME ADDICTED TO MY PAIN MEDICATION?    Pain medications are safe and effective when used as directed. However, misuse of these products can be extremely harmful and even deadly. Pain medications must be taken only as directed by your surgeon. Do not change the dose of your pain medication without talking to your operating physician first.    POSTOPERATIVE INFORMATION: MANAGING PAIN  Measuring Your Pain    A pain scale helps you rate pain intensity. In the scale, 0 means no pain, and 10 is the worst pain possible.  You should rate your pain every 4-6 hours. You may feel some pain even with medications. It is important to tell your health care provider if medications don't reduce the pain.        Managing Post-Op Pain at Home: Medications    Pain after an operation (post-op pain) is common and expected. These guidelines can help you stay as comfortable as possible.    Taking Pain Medications    Take any prescribed pain medications as directed by your doctor.  Take pain medications with some food to avoid an upset stomach.  Be aware that narcotic pain medications cause constipation. We recommend taking Milk of  Magnesia   Eating high-fiber foods and drink plenty of water.  Don t drink alcohol while using pain medications.  Possible side effects include stomach upset, nausea, and itching.    Alternating Ibuprofen with your pain medication    Ibuprofen has three actions: it reduces fever, relieves pain and fights inflammation. Alternate between your prescribed pain medication and Ibuprofen every three hours (i.e., take a dose of Ibuprofen then three hours later take your prescribed pain medication then three hours later Ibuprofen, ect.) These two medications are safe to use together like this.    POSTOPERATIVE INFORMATION: CONSTIPATION    Constipation after surgery is a common problem and is often related to taking post-operative narcotic pain medication. Signs that you may be constipated include bloating, abdominal distention and/or pain.    Constipation Prevention & Treatment    Drink plenty of fluids! This is the most important thing you can do to help prevent constipation.  Increase physical activity as recommended by your surgeon.  Consume a high fiber diet. We recommend introducing high fiber foods pre-operatively. Some foods high in fiber include:    Oatmeal Bran  Flaxseed Dried Fruit    Carrots   Bananas Strawberries Oranges Whole Grain Bread     Bananas Prunes  Pears  Raspberries     Over the counter medication/supplements - in order of recommended treatment:   (Be sure to follow instructions on product packaging)    Fiber supplement   Bulk forming laxative - Can be used to prevent constipation  Great food sources of fiber include but are not limited to:  Colace (docusate sodium)  Osmotic stool softener - Can be used 2-3 times per day to prevent constipation  Milk of Magnesia  MIRALAX  Enema/Suppository    Patient can also  some probiotics such as Culturelle to help prevent Antibiotic associated diarrhea. They can take this 1 hour before or 2 hours after taking their antibiotic should not be taken at the same  time as they can cancel out the effects.                          ** AFTER SURGERY INSTRUCTIONS **                          [] You are to remain LIMITED WEIGHT BEARING on your left foot LIMITED WEIGHT BEARING MEANS THAT IT IS ONLY OKAY FOR YOU TO APPLY LIGHT PRESSURE ON THE AFFECTED FOOT WHEN TRANSFERRING FROM YOUR ASSISTIVE DEVICE TO A CHAIR OR BED.    [] During surgery   will apply a gauze dressing to your foot. This will remain intact until you are seen in clinic for follow up    [] It is NOT OKAY to get your surgical site wet while bathing, you will need to purchase a cast cover to protect your foot from getting wet. You can purchase this at [facility] or your local pharmacy.    [] It is important that you elevate your affected foot after surgery. Proper elevation is raising your foot above your waist. The fluid in your lower extremities needs to get back to your heart so it can get pumped to your kidneys and expelled through urination. So if you notice you have to go to the bathroom more frequently when you are elevating your leg this is a good sign that it is working.     [] It is important that you increase your protein intake after surgery. Protein is essential for wound healing. We recommend you take a protein supplement twice per day. This is in addition to your normal diet. Examples of protein supplements are Ensure, Boost, Glucerna (if you are diabetic), or protein powder. You can purchase these at your local retailer or grocery store.       Notify our office right away, if you have any changes in your health status, or if you develop a cold, flu, diarrhea, infection, fever or sore throat before your scheduled surgery date. We can be reached at [phone]   Monday-Friday 8 am - 4:30 pm if you have any questions.     Thank you for trusting us with your care!      For informational purposes only. Not to replace the advice of your health care provider. Copyright   2020 [facility]  Services. All rights reserved. Clinically reviewed by Infection Prevention and [facility] COVID-19 Clinical Team. [employer] [id number] - Rev 09/09/21.

## 2023-05-30 NOTE — Clinical Note
"    5/30/2023         RE: Liang Vázquez  [address]  [facility] [address]        Dear Colleague,    Thank you for referring your patient, Liang Vázquez, to the [facility]. Please see a copy of my visit note below.    FOOT AND ANKLE SURGERY/PODIATRY CONSULT NOTE         ASSESSMENT:   ***      TREATMENT:  ***        HPI:Liang Vázquez presented to the clinic today  ***.     {PAST MEDICAL HISTORY:495069708::\"***\"}    {SOCIAL HISTORY:332397153::\"***\"}     No Known Allergies       Current Outpatient Medications:      omeprazole (PRILOSEC) 20 MG DR capsule, Take 1 capsule (20 mg) by mouth daily, Disp: 90 capsule, Rfl: 3     Family History   Problem Relation Age of Onset     No Known Problems Mother      Hypertension Father         Social History     Socioeconomic History     Marital status:      Spouse name: Not on file     Number of children: Not on file     Years of education: Not on file     Highest education level: Not on file   Occupational History     Not on file   Tobacco Use     Smoking status: Never     Smokeless tobacco: Never   Vaping Use     Vaping status: Not on file   Substance and Sexual Activity     Alcohol use: Yes     Alcohol/week: 0.8 - 1.7 standard drinks of alcohol     Drug use: No     Sexual activity: Not on file   Other Topics Concern     Not on file   Social History Narrative     Not on file     Social Determinants of Health     Financial Resource Strain: Not on file   Food Insecurity: Not on file   Transportation Needs: Not on file   Physical Activity: Not on file   Stress: Not on file   Social Connections: Not on file   Intimate Partner Violence: Not on file   Housing Stability: Not on file        Review of Systems - Patient denies fever, chills, rash, wound, stiffness, limping, numbness, weakness, heart burn, blood in stool, chest pain with activity, calf pain when walking, shortness of breath with activity, chronic cough, easy bleeding/bruising, swelling " "of ankles, excessive thirst, fatigue, depression, anxiety.  Patient admits to ***.      OBJECTIVE:  Appearance: {appearance:514695::\"alert, well appearing, and in no distress\"}.    /64   Pulse 77   Wt 78.5 kg (173 lb)   SpO2 98%   BMI 27.30 kg/m       Body mass index is 27.3 kg/m .     General appearance: Patient is alert and fully cooperative with history & exam.  No sign of distress is noted during the visit.  Psychiatric: Affect is pleasant & appropriate.  Patient appears motivated to improve health.  Respiratory: Breathing is regular & unlabored while sitting.  HEENT: Hearing is intact to spoken word.  Speech is clear.  No gross evidence of visual impairment that would impact ambulation.    Vascular: Dorsalis pedis and posterior tibial pulses are palpable. There is pedal hair growth ***.  CFT < 3 sec from anterior tibial surface to distal digits ***. There is no appreciable edema noted.  Dermatologic: Turgor and texture are within normal limits. No coloration or temperature changes. No primary or secondary lesions noted.  Neurologic: All epicritic and proprioceptive sensations are grossly intact ***.  Musculoskeletal: All active and passive ankle, subtalar, midtarsal, and 1st MPJ range of motion are grossly intact without pain or crepitus, with the exception of ***. Manual muscle strength is ***. All dorsiflexors, plantarflexors, invertors, evertors are intact ***. Tenderness present to *** on palpation. Tenderness to *** with range of motion. Calf is soft/non-tender with/without warmth/induration    Imaging:     {Imaging order/result:51708}  No images are attached to the encounter or orders placed in the encounter.     No results found.   No results found.       TREATMENT:  ***    Andrea Langley DPM  [facility] Foot & Ankle Surgery/Podiatry         Again, thank you for allowing me to participate in the care of your patient.        Sincerely,        Andrea Argueta DPM  "

## 2023-05-31 ENCOUNTER — TELEPHONE (OUTPATIENT)
Dept: PODIATRY | Facility: CLINIC | Age: 43
End: 2023-05-31
Payer: COMMERCIAL

## 2023-05-31 PROBLEM — G57.62 MORTON'S NEUROMA OF LEFT FOOT: Status: ACTIVE | Noted: 2023-05-31

## 2023-05-31 NOTE — TELEPHONE ENCOUNTER
Surgery scheduled with Dr. Argueta at [facility] on 06/23/2023.  Excision of Bennett's neuroma left foot. CPT Code 31202    Email sent to [place].  Added to [place].    Pre-op physical 06/07/2023  Post ops scheduled.  [employer] message sent.

## 2023-06-07 ENCOUNTER — OFFICE VISIT (OUTPATIENT)
Dept: INTERNAL MEDICINE | Facility: CLINIC | Age: 43
End: 2023-06-07
Payer: COMMERCIAL

## 2023-06-07 VITALS
OXYGEN SATURATION: 97 % | HEIGHT: 67 IN | HEART RATE: 61 BPM | BODY MASS INDEX: 27.62 KG/M2 | RESPIRATION RATE: 16 BRPM | WEIGHT: 176 LBS | SYSTOLIC BLOOD PRESSURE: 112 MMHG | DIASTOLIC BLOOD PRESSURE: 80 MMHG | TEMPERATURE: 98.6 F

## 2023-06-07 DIAGNOSIS — Z01.818 PREOP GENERAL PHYSICAL EXAM: Primary | ICD-10-CM

## 2023-06-07 DIAGNOSIS — G57.62 MORTON'S NEUROMA OF LEFT FOOT: ICD-10-CM

## 2023-06-07 PROCEDURE — 99214 OFFICE O/P EST MOD 30 MIN: CPT | Performed by: INTERNAL MEDICINE

## 2023-06-07 NOTE — H&P (VIEW-ONLY)
[facility]  [address] [place] [address]  Phone: [phone]  Fax: [phone]  Primary Provider: Tram Delatorre  Pre-op Performing Provider: TRAM DELATORRE    PREOPERATIVE EVALUATION:  Today's date: 6/7/2023    Liang Vázquez is a 42 year old male who presents for a preoperative evaluation.      6/7/2023     4:42 PM   Additional Questions   Roomed by Do HOLLAND   Accompanied by kim         6/7/2023     4:42 PM   Patient Reported Additional Medications   Patient reports taking the following new medications no     Surgical Information:  Surgery/Procedure: Mortons Neuroma Lt foot  Surgery Location: [facility]  Surgeon: Dr Argueta  Surgery Date: 6/23/23  Time of Surgery: na  Where patient plans to recover: At home with family  Fax number for surgical facility: Note does not need to be faxed, will be available electronically in Epic.    Assessment & Plan     The proposed surgical procedure is considered LOW risk.    Satisfactory preoperative examination in anticipation of of foot surgery to excise a Bennett's neuroma in his left foot, in this historically very healthy 42-year-old man, who takes only omeprazole as his only medication.    Since my last meeting with Esteban October 26, 2022, he has felt fine, working full-time, but his left foot is been bothering him.    Dr. Argueta consultation note from May 30, 2023 says that the surgical excision of the symptomatic Bennett's neuroma left foot would be done under local anesthesia with IV sedation.  Procedure takes about 10 minutes.    I reviewed with him the laboratory work that was run on November 2, 2022, which had a completely normal CBC, and metabolic panel showing a slightly elevated ALT liver enzyme, but better than previous levels.  His cholesterol profile was suboptimal, with elevated LDL (bad cholesterol), mildly elevated triglycerides, and somewhat low HDL (good cholesterol).  Esteban is going to work  on heart healthy eating, plenty of exercise, and weight control.    He has a history of shoulder surgery in February 2022, which went fine, no problems with anesthesia, bleeding, or clotting.    His upcoming foot surgery will be done with a regional nerve block.  No significant blood loss anticipated.    I do not think we need to do any laboratory work today for Esteban.  No suspicion of any heart problems.  His physical exam is fine.      RECOMMENDATION:  APPROVAL GIVEN to proceed with proposed procedure, without further diagnostic evaluation.      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  I reminded him that the omeprazole does not actually stop the mechanism of reflux, it merely shifts the stomach contents pH to be less acidic.  I reminded him of the importance of being sure the stomach is pretty much empty before he lies down.  Therefore leave approximately 90 minutes, probably longer, after meal before lying down     Mildly elevated ALT liver enzymes  He told me that alcohol consumption is approximately couple drinks a week.   Latest Reference Range & Units 02/02/21 08:38 04/21/21 07:57 10/20/21 07:58 11/03/22 07:25   ALT 10 - 50 U/L 108 (H) 157 (H) 41 59 (H)      Overweight by body mass index criteria, 27.8, ideal would be more like 25  I reminded Esteban importance of eating slowly, controlling portion size, and identifying problem foods to reduce or even eliminate such as starches, sweets, and fried foods.  Alcoholic beverages tend to bring carbohydrate calories  Wt Readings from Last 5 Encounters:   06/07/23 79.8 kg (176 lb)   05/30/23 78.5 kg  (173 lb)   10/26/22 79.8 kg (176 lb)   02/02/21 83.2 kg (183 lb 6.4 oz)   01/17/17 79.8 kg (176 lb)     Good blood pressure  BP Readings from Last 6 Encounters:   06/07/23 112/80   05/30/23 121/64   10/26/22 110/82   02/02/21 116/70     Mixed dyslipidemia with mildly elevated LDL and triglycerides, and low HDL      Latest Reference Range & Units 11/03/22 07:25   Cholesterol <200 mg/dL 207 (H)   Glucose 70 - 99 mg/dL 97   HDL Cholesterol >=40 mg/dL 41   Hemoglobin A1C 0.0 - 5.6 % 5.2   LDL Cholesterol Calculated <=100 mg/dL 134 (H)   Non HDL Cholesterol <130 mg/dL 166 (H)   Triglycerides <150 mg/dL 159 (H)   (H): Data is abnormally high    Acne and benign-appearing moles on the back, skin tag right biceps area     Future preventive screening tests  I told Esteban that at age 45, could consider starting prostate cancer screening with annual PSA blood tests  Colon cancer screening could start at age 45.  Options include stool based test such as Cologuard, or using colonoscopy.     Has avoided COVID illness, as far as he knows  Would like a Pfizer booster and flu shot            6/6/2023     1:23 PM   Preop Questions   1. Have you ever had a heart attack or stroke? No   2. Have you ever had surgery on your heart or blood vessels, such as a stent placement, a coronary artery bypass, or surgery on an artery in your head, neck, heart, or legs? No   3. Do you have chest pain with activity? No   4. Do you have a history of  heart failure? No   5. Do you currently have a cold, bronchitis or symptoms of other infection? No   6. Do you have a cough, shortness of breath, or wheezing? No   7. Do you or anyone in your family have previous history of blood clots? No   8. Do you or does anyone in your family have a serious bleeding problem such as prolonged bleeding following surgeries or cuts? No   9. Have you ever had problems with anemia or been told to take iron pills? No   10. Have you had any abnormal blood loss such as black,  "tarry or bloody stools? No   11. Have you ever had a blood transfusion? No   12. Are you willing to have a blood transfusion if it is medically needed before, during, or after your surgery? Yes   13. Have you or any of your relatives ever had problems with anesthesia? No   14. Do you have sleep apnea, excessive snoring or daytime drowsiness? No   15. Do you have any artifical heart valves or other implanted medical devices like a pacemaker, defibrillator, or continuous glucose monitor? No   16. Do you have artificial joints? No   17. Are you allergic to latex? No       Review of Systems  CONSTITUTIONAL: NEGATIVE for fever, chills, change in weight  ENT/MOUTH: NEGATIVE for ear, mouth and throat problems  RESP: NEGATIVE for significant cough or SOB  CV: NEGATIVE for chest pain, palpitations or peripheral edema    Patient Active Problem List    Diagnosis Date Noted     Bennett's neuroma of left foot 05/31/2023     Priority: Medium     Added automatically from request for surgery [id number]       Fatty liver-- ultrasound 4- 04/23/2021     Priority: Medium     Abnormal LFTs (liver function tests) 02/03/2021     Priority: Medium     SLAP tear of shoulder 02/02/2021     Priority: Medium     Esophageal Reflux      Priority: Medium     Created by Conversion          No past medical history on file.  No past surgical history on file.  Current Outpatient Medications   Medication Sig Dispense Refill     omeprazole (PRILOSEC) 20 MG DR capsule Take 1 capsule (20 mg) by mouth daily 90 capsule 3       No Known Allergies     Social History     Tobacco Use     Smoking status: Never     Smokeless tobacco: Never   Vaping Use     Vaping status: Never Used   Substance Use Topics     Alcohol use: Yes     Alcohol/week: 0.8 - 1.7 standard drinks of alcohol       History   Drug Use No         Objective     /80   Pulse 61   Temp 98.6  F (37  C)   Resp 16   Ht 1.689 m (5' 6.5\")   Wt 79.8 kg (176 lb)   SpO2 97%   BMI 27.98 kg/m  "     Physical Exam  General: Alert, in no distress  Skin: No significant lesion seen.  Eyes/nose/throat: Eyes without scleral icterus, eye movements normal, pupils equal and reactive, oropharynx clear  MSK: Neck with good ROM  Pulm: Lungs clear to auscultation bilaterally  Cardiac: Heart with regular rate and rhythm, no murmur or gallop  GI: Abdomen soft, nontender  MSK: Extremities no tenderness or edema  Neuro: Moves all extremities, without focal weakness  Psych: Alert, normal mental status. Normal affect and speech      Recent Labs   Lab Test 11/03/22  0725 10/20/21  0758   HGB 16.7  --      --     142   POTASSIUM 4.1 4.9   CR 1.10 0.95   A1C 5.2  --       Diagnostics:    Revised Cardiac Risk Index (RCRI):  The patient has the following serious cardiovascular risks for perioperative complications:   - No serious cardiac risks = 0 points     RCRI Interpretation: 0 points: Class I (very low risk - 0.4% complication rate)      Signed Electronically by: TRAM BORJA MD  Copy of this evaluation report is provided to requesting physician.

## 2023-06-07 NOTE — PROGRESS NOTES
[facility]  [address] [place] [id number]  Phone: [phone]  Fax: [phone]  Primary Provider: Tram Delatorre  Pre-op Performing Provider: TRAM DELATORRE    PREOPERATIVE EVALUATION:  Today's date: 6/7/2023    Liang Vázquez is a 42 year old male who presents for a preoperative evaluation.      6/7/2023     4:42 PM   Additional Questions   Roomed by Do HOLLAND   Accompanied by kim         6/7/2023     4:42 PM   Patient Reported Additional Medications   Patient reports taking the following new medications no     Surgical Information:  Surgery/Procedure: Mortons Neuroma Lt foot  Surgery Location: [facility]  Surgeon: Dr Argueta  Surgery Date: 6/23/23  Time of Surgery: na  Where patient plans to recover: At home with family  Fax number for surgical facility: Note does not need to be faxed, will be available electronically in Epic.    Assessment & Plan     The proposed surgical procedure is considered LOW risk.    Satisfactory preoperative examination in anticipation of of foot surgery to excise a Bennett's neuroma in his left foot, in this historically very healthy 42-year-old man, who takes only omeprazole as his only medication.    Since my last meeting with Esteban October 26, 2022, he has felt fine, working full-time, but his left foot is been bothering him.    Dr. Argueta consultation note from May 30, 2023 says that the surgical excision of the symptomatic Bennett's neuroma left foot would be done under local anesthesia with IV sedation.  Procedure takes about 10 minutes.    I reviewed with him the laboratory work that was run on November 2, 2022, which had a completely normal CBC, and metabolic panel showing a slightly elevated ALT liver enzyme, but better than previous levels.  His cholesterol profile was suboptimal, with elevated LDL (bad cholesterol), mildly elevated triglycerides, and somewhat low HDL (good cholesterol).  Esteban is going to work  on heart healthy eating, plenty of exercise, and weight control.    He has a history of shoulder surgery in February 2022, which went fine, no problems with anesthesia, bleeding, or clotting.    His upcoming foot surgery will be done with a regional nerve block.  No significant blood loss anticipated.    I do not think we need to do any laboratory work today for Esteban.  No suspicion of any heart problems.  His physical exam is fine.      RECOMMENDATION:  APPROVAL GIVEN to proceed with proposed procedure, without further diagnostic evaluation.      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  I reminded him that the omeprazole does not actually stop the mechanism of reflux, it merely shifts the stomach contents pH to be less acidic.  I reminded him of the importance of being sure the stomach is pretty much empty before he lies down.  Therefore leave approximately 90 minutes, probably longer, after meal before lying down     Mildly elevated ALT liver enzymes  He told me that alcohol consumption is approximately couple drinks a week.   Latest Reference Range & Units 02/02/21 08:38 04/21/21 07:57 10/20/21 07:58 11/03/22 07:25   ALT 10 - 50 U/L 108 (H) 157 (H) 41 59 (H)      Overweight by body mass index criteria, 27.8, ideal would be more like 25  I reminded Esteban importance of eating slowly, controlling portion size, and identifying problem foods to reduce or even eliminate such as starches, sweets, and fried foods.  Alcoholic beverages tend to bring carbohydrate calories  Wt Readings from Last 5 Encounters:   06/07/23 79.8 kg (176 lb)   05/30/23 78.5 kg  (173 lb)   10/26/22 79.8 kg (176 lb)   02/02/21 83.2 kg (183 lb 6.4 oz)   01/17/17 79.8 kg (176 lb)     Good blood pressure  BP Readings from Last 6 Encounters:   06/07/23 112/80   05/30/23 121/64   10/26/22 110/82   02/02/21 116/70     Mixed dyslipidemia with mildly elevated LDL and triglycerides, and low HDL      Latest Reference Range & Units 11/03/22 07:25   Cholesterol <200 mg/dL 207 (H)   Glucose 70 - 99 mg/dL 97   HDL Cholesterol >=40 mg/dL 41   Hemoglobin A1C 0.0 - 5.6 % 5.2   LDL Cholesterol Calculated <=100 mg/dL 134 (H)   Non HDL Cholesterol <130 mg/dL 166 (H)   Triglycerides <150 mg/dL 159 (H)   (H): Data is abnormally high    Acne and benign-appearing moles on the back, skin tag right biceps area     Future preventive screening tests  I told Esteban that at age 45, could consider starting prostate cancer screening with annual PSA blood tests  Colon cancer screening could start at age 45.  Options include stool based test such as Cologuard, or using colonoscopy.     Has avoided COVID illness, as far as he knows  Would like a Pfizer booster and flu shot            6/6/2023     1:23 PM   Preop Questions   1. Have you ever had a heart attack or stroke? No   2. Have you ever had surgery on your heart or blood vessels, such as a stent placement, a coronary artery bypass, or surgery on an artery in your head, neck, heart, or legs? No   3. Do you have chest pain with activity? No   4. Do you have a history of  heart failure? No   5. Do you currently have a cold, bronchitis or symptoms of other infection? No   6. Do you have a cough, shortness of breath, or wheezing? No   7. Do you or anyone in your family have previous history of blood clots? No   8. Do you or does anyone in your family have a serious bleeding problem such as prolonged bleeding following surgeries or cuts? No   9. Have you ever had problems with anemia or been told to take iron pills? No   10. Have you had any abnormal blood loss such as black,  "tarry or bloody stools? No   11. Have you ever had a blood transfusion? No   12. Are you willing to have a blood transfusion if it is medically needed before, during, or after your surgery? Yes   13. Have you or any of your relatives ever had problems with anesthesia? No   14. Do you have sleep apnea, excessive snoring or daytime drowsiness? No   15. Do you have any artifical heart valves or other implanted medical devices like a pacemaker, defibrillator, or continuous glucose monitor? No   16. Do you have artificial joints? No   17. Are you allergic to latex? No       Review of Systems  CONSTITUTIONAL: NEGATIVE for fever, chills, change in weight  ENT/MOUTH: NEGATIVE for ear, mouth and throat problems  RESP: NEGATIVE for significant cough or SOB  CV: NEGATIVE for chest pain, palpitations or peripheral edema    Patient Active Problem List    Diagnosis Date Noted     Bennett's neuroma of left foot 05/31/2023     Priority: Medium     Added automatically from request for surgery [id number]       Fatty liver-- ultrasound 4- 04/23/2021     Priority: Medium     Abnormal LFTs (liver function tests) 02/03/2021     Priority: Medium     SLAP tear of shoulder 02/02/2021     Priority: Medium     Esophageal Reflux      Priority: Medium     Created by Conversion          No past medical history on file.  No past surgical history on file.  Current Outpatient Medications   Medication Sig Dispense Refill     omeprazole (PRILOSEC) 20 MG DR capsule Take 1 capsule (20 mg) by mouth daily 90 capsule 3       No Known Allergies     Social History     Tobacco Use     Smoking status: Never     Smokeless tobacco: Never   Vaping Use     Vaping status: Never Used   Substance Use Topics     Alcohol use: Yes     Alcohol/week: 0.8 - 1.7 standard drinks of alcohol       History   Drug Use No         Objective     /80   Pulse 61   Temp 98.6  F (37  C)   Resp 16   Ht 1.689 m (5' 6.5\")   Wt 79.8 kg (176 lb)   SpO2 97%   BMI 27.98 kg/m  "     Physical Exam  General: Alert, in no distress  Skin: No significant lesion seen.  Eyes/nose/throat: Eyes without scleral icterus, eye movements normal, pupils equal and reactive, oropharynx clear  MSK: Neck with good ROM  Pulm: Lungs clear to auscultation bilaterally  Cardiac: Heart with regular rate and rhythm, no murmur or gallop  GI: Abdomen soft, nontender  MSK: Extremities no tenderness or edema  Neuro: Moves all extremities, without focal weakness  Psych: Alert, normal mental status. Normal affect and speech      Recent Labs   Lab Test 11/03/22  0725 10/20/21  0758   HGB 16.7  --      --     142   POTASSIUM 4.1 4.9   CR 1.10 0.95   A1C 5.2  --       Diagnostics:    Revised Cardiac Risk Index (RCRI):  The patient has the following serious cardiovascular risks for perioperative complications:   - No serious cardiac risks = 0 points     RCRI Interpretation: 0 points: Class I (very low risk - 0.4% complication rate)      Signed Electronically by: TRAM BORJA MD  Copy of this evaluation report is provided to requesting physician.

## 2023-06-22 ENCOUNTER — ANESTHESIA EVENT (OUTPATIENT)
Dept: SURGERY | Facility: AMBULATORY SURGERY CENTER | Age: 43
End: 2023-06-22
Payer: COMMERCIAL

## 2023-06-22 ASSESSMENT — LIFESTYLE VARIABLES: TOBACCO_USE: 0

## 2023-06-23 ENCOUNTER — ANESTHESIA (OUTPATIENT)
Dept: SURGERY | Facility: AMBULATORY SURGERY CENTER | Age: 43
End: 2023-06-23
Payer: COMMERCIAL

## 2023-06-23 ENCOUNTER — HOSPITAL ENCOUNTER (OUTPATIENT)
Facility: AMBULATORY SURGERY CENTER | Age: 43
Discharge: HOME | End: 2023-06-23
Attending: PODIATRIST
Payer: COMMERCIAL

## 2023-06-23 VITALS
RESPIRATION RATE: 16 BRPM | TEMPERATURE: 97.7 F | SYSTOLIC BLOOD PRESSURE: 121 MMHG | OXYGEN SATURATION: 94 % | HEART RATE: 63 BPM | BODY MASS INDEX: 27.31 KG/M2 | DIASTOLIC BLOOD PRESSURE: 70 MMHG | WEIGHT: 174 LBS | HEIGHT: 67 IN

## 2023-06-23 DIAGNOSIS — G57.62 MORTON'S NEUROMA OF LEFT FOOT: ICD-10-CM

## 2023-06-23 PROCEDURE — 28080 REMOVAL OF FOOT LESION: CPT | Mod: LT | Performed by: PODIATRIST

## 2023-06-23 RX ORDER — ACETAMINOPHEN 325 MG/1
975 TABLET ORAL ONCE
Status: COMPLETED | OUTPATIENT
Start: 2023-06-23 | End: 2023-06-23

## 2023-06-23 RX ORDER — GLYCOPYRROLATE 0.2 MG/ML
INJECTION, SOLUTION INTRAMUSCULAR; INTRAVENOUS PRN
Status: DISCONTINUED | OUTPATIENT
Start: 2023-06-23 | End: 2023-06-23

## 2023-06-23 RX ORDER — POVIDONE-IODINE 10 MG/G
OINTMENT TOPICAL PRN
Status: DISCONTINUED | OUTPATIENT
Start: 2023-06-23 | End: 2023-06-23 | Stop reason: HOSPADM

## 2023-06-23 RX ORDER — SODIUM CHLORIDE, SODIUM LACTATE, POTASSIUM CHLORIDE, CALCIUM CHLORIDE 600; 310; 30; 20 MG/100ML; MG/100ML; MG/100ML; MG/100ML
INJECTION, SOLUTION INTRAVENOUS CONTINUOUS
Status: DISCONTINUED | OUTPATIENT
Start: 2023-06-23 | End: 2023-06-24 | Stop reason: HOSPADM

## 2023-06-23 RX ORDER — CEPHALEXIN 500 MG/1
500 CAPSULE ORAL 2 TIMES DAILY
Qty: 14 CAPSULE | Refills: 0 | Status: SHIPPED | OUTPATIENT
Start: 2023-06-23 | End: 2023-06-30

## 2023-06-23 RX ORDER — OXYCODONE HYDROCHLORIDE 5 MG/1
5 TABLET ORAL
Status: DISCONTINUED | OUTPATIENT
Start: 2023-06-23 | End: 2023-06-24 | Stop reason: HOSPADM

## 2023-06-23 RX ORDER — OXYCODONE HYDROCHLORIDE 10 MG/1
10 TABLET ORAL
Status: DISCONTINUED | OUTPATIENT
Start: 2023-06-23 | End: 2023-06-24 | Stop reason: HOSPADM

## 2023-06-23 RX ORDER — HYDROCODONE BITARTRATE AND ACETAMINOPHEN 5; 325 MG/1; MG/1
1-2 TABLET ORAL EVERY 6 HOURS PRN
Qty: 20 TABLET | Refills: 0 | Status: SHIPPED | OUTPATIENT
Start: 2023-06-23 | End: 2024-01-24

## 2023-06-23 RX ORDER — ONDANSETRON 4 MG/1
4 TABLET, ORALLY DISINTEGRATING ORAL EVERY 30 MIN PRN
Status: DISCONTINUED | OUTPATIENT
Start: 2023-06-23 | End: 2023-06-24 | Stop reason: HOSPADM

## 2023-06-23 RX ORDER — PROPOFOL 10 MG/ML
INJECTION, EMULSION INTRAVENOUS CONTINUOUS PRN
Status: DISCONTINUED | OUTPATIENT
Start: 2023-06-23 | End: 2023-06-23

## 2023-06-23 RX ORDER — FENTANYL CITRATE 50 UG/ML
INJECTION, SOLUTION INTRAMUSCULAR; INTRAVENOUS PRN
Status: DISCONTINUED | OUTPATIENT
Start: 2023-06-23 | End: 2023-06-23

## 2023-06-23 RX ORDER — LIDOCAINE HYDROCHLORIDE 20 MG/ML
INJECTION, SOLUTION INFILTRATION; PERINEURAL PRN
Status: DISCONTINUED | OUTPATIENT
Start: 2023-06-23 | End: 2023-06-23

## 2023-06-23 RX ORDER — ONDANSETRON 2 MG/ML
4 INJECTION INTRAMUSCULAR; INTRAVENOUS EVERY 30 MIN PRN
Status: DISCONTINUED | OUTPATIENT
Start: 2023-06-23 | End: 2023-06-24 | Stop reason: HOSPADM

## 2023-06-23 RX ORDER — ONDANSETRON 2 MG/ML
INJECTION INTRAMUSCULAR; INTRAVENOUS PRN
Status: DISCONTINUED | OUTPATIENT
Start: 2023-06-23 | End: 2023-06-23

## 2023-06-23 RX ORDER — LIDOCAINE HYDROCHLORIDE 20 MG/ML
INJECTION, SOLUTION INFILTRATION; PERINEURAL PRN
Status: DISCONTINUED | OUTPATIENT
Start: 2023-06-23 | End: 2023-06-23 | Stop reason: HOSPADM

## 2023-06-23 RX ORDER — BUPIVACAINE HYDROCHLORIDE 5 MG/ML
INJECTION, SOLUTION PERINEURAL PRN
Status: DISCONTINUED | OUTPATIENT
Start: 2023-06-23 | End: 2023-06-23 | Stop reason: HOSPADM

## 2023-06-23 RX ORDER — LIDOCAINE 40 MG/G
CREAM TOPICAL
Status: DISCONTINUED | OUTPATIENT
Start: 2023-06-23 | End: 2023-06-24 | Stop reason: HOSPADM

## 2023-06-23 RX ADMIN — FENTANYL CITRATE 25 MCG: 50 INJECTION, SOLUTION INTRAMUSCULAR; INTRAVENOUS at 07:02

## 2023-06-23 RX ADMIN — SODIUM CHLORIDE, SODIUM LACTATE, POTASSIUM CHLORIDE, CALCIUM CHLORIDE: 600; 310; 30; 20 INJECTION, SOLUTION INTRAVENOUS at 06:55

## 2023-06-23 RX ADMIN — ACETAMINOPHEN 975 MG: 325 TABLET ORAL at 06:11

## 2023-06-23 RX ADMIN — FENTANYL CITRATE 25 MCG: 50 INJECTION, SOLUTION INTRAMUSCULAR; INTRAVENOUS at 07:10

## 2023-06-23 RX ADMIN — PROPOFOL 140 MCG/KG/MIN: 10 INJECTION, EMULSION INTRAVENOUS at 06:58

## 2023-06-23 RX ADMIN — LIDOCAINE HYDROCHLORIDE 3 ML: 20 INJECTION, SOLUTION INFILTRATION; PERINEURAL at 06:58

## 2023-06-23 RX ADMIN — ONDANSETRON 4 MG: 2 INJECTION INTRAMUSCULAR; INTRAVENOUS at 07:08

## 2023-06-23 RX ADMIN — GLYCOPYRROLATE 0.2 MG: 0.2 INJECTION, SOLUTION INTRAMUSCULAR; INTRAVENOUS at 07:01

## 2023-06-23 ASSESSMENT — COPD QUESTIONNAIRES: COPD: 0

## 2023-06-23 NOTE — PROGRESS NOTES
Pt and family verbalize good understanding of discharge teach and follow up with MD.   VSS,Surgical incision CDI. D/C criteria met. Pt verbalizes readiness to go home.   Home with wife. Post op shoe on     @ME2@ 6/23/2023 8:29 AM

## 2023-06-23 NOTE — DISCHARGE INSTRUCTIONS
You have received 975 mg of Acetaminophen (Tylenol) at 6:15 AM. Please do not take an additional dose of Tylenol until after 12:15 PM     Do not exceed 4,000 mg of acetaminophen during a 24 hour period and keep in mind that acetaminophen can also be found in many over-the-counter cold medications as well as narcotics that may be given for pain.     If you have any questions or concerns regarding your procedure, please contact Dr. Argueta, his office number is [phone].

## 2023-06-23 NOTE — INTERVAL H&P NOTE
"I have reviewed the surgical (or preoperative) H&P that is linked to this encounter, and examined the patient. There are no significant changes    Clinical Conditions Present on Arrival:  Clinically Significant Risk Factors Present on Admission                  # Overweight: Estimated body mass index is 27.25 kg/m  as calculated from the following:    Height as of this encounter: 1.702 m (5' 7\").    Weight as of this encounter: 78.9 kg (174 lb).       "

## 2023-06-23 NOTE — ANESTHESIA PREPROCEDURE EVALUATION
Anesthesia Pre-Procedure Evaluation    Patient: Liang Vázquez   MRN: [id number] : 1980        Procedure : Procedure(s):  EXCISION, BENNETT'S NEUROMA left foot          Past Medical History:   Diagnosis Date     Gastroesophageal reflux disease       History reviewed. No pertinent surgical history.   No Known Allergies   Social History     Tobacco Use     Smoking status: Never     Smokeless tobacco: Never   Substance Use Topics     Alcohol use: Yes     Alcohol/week: 0.8 - 1.7 standard drinks of alcohol     Types: 1 - 2 Standard drinks or equivalent per week     Comment: occas      Wt Readings from Last 1 Encounters:   23 79.8 kg (176 lb)        Anesthesia Evaluation   Pt has had prior anesthetic. Type: General.    No history of anesthetic complications       ROS/MED HX  ENT/Pulmonary:  - neg pulmonary ROS  (-) tobacco use, asthma, COPD, sleep apnea and BISMARK risk factors   Neurologic:  - neg neurologic ROS     Cardiovascular:    (-) hypertension, CHF, COLINDRSE and dyslipidemia   METS/Exercise Tolerance: >4 METS    Hematologic:    (-) history of blood clots and anemia   Musculoskeletal: Comment: Bennett's neuroma      GI/Hepatic:     (+) GERD, Asymptomatic on medication, liver disease,  (-) hiatal hernia   Renal/Genitourinary:  - neg Renal ROS  (-) renal disease   Endo:  - neg endo ROS  (-) Type I DM, Type II DM, thyroid disease and obesity   Psychiatric/Substance Use:  - neg psychiatric ROS     Infectious Disease:  - neg infectious disease ROS     Malignancy:  - neg malignancy ROS     Other:  - neg other ROS          Physical Exam    Airway  airway exam normal      Mallampati: II   TM distance: > 3 FB   Neck ROM: full   Mouth opening: > 3 cm    Respiratory Devices and Support         Dental       (+) Completely normal teeth      Cardiovascular   cardiovascular exam normal       Rhythm and rate: regular and normal     Pulmonary   pulmonary exam normal        breath sounds clear to auscultation           OUTSIDE  LABS:  CBC:   Lab Results   Component Value Date    WBC 6.2 11/03/2022    WBC 5.3 02/02/2021    HGB 16.7 11/03/2022    HGB 16.0 02/02/2021    HCT 47.8 11/03/2022    HCT 46.0 02/02/2021     11/03/2022     02/02/2021     BMP:   Lab Results   Component Value Date     11/03/2022     10/20/2021    POTASSIUM 4.1 11/03/2022    POTASSIUM 4.9 10/20/2021    CHLORIDE 101 11/03/2022    CHLORIDE 104 10/20/2021    CO2 25 11/03/2022    CO2 27 10/20/2021    BUN 26.0 (H) 11/03/2022    BUN 20 10/20/2021    CR 1.10 11/03/2022    CR 0.95 10/20/2021    GLC 97 11/03/2022    GLC 94 10/20/2021     COAGS: No results found for: PTT, INR, FIBR  POC: No results found for: BGM, HCG, HCGS  HEPATIC:   Lab Results   Component Value Date    ALBUMIN 4.8 11/03/2022    PROTTOTAL 7.9 11/03/2022    ALT 59 (H) 11/03/2022    AST 38 11/03/2022    ALKPHOS 56 11/03/2022    BILITOTAL 0.6 11/03/2022     OTHER:   Lab Results   Component Value Date    A1C 5.2 11/03/2022    BRIGITTE 9.5 11/03/2022       Anesthesia Plan    ASA Status:  2   NPO Status:  NPO Appropriate    Anesthesia Type: MAC.              Consents    Anesthesia Plan(s) and associated risks, benefits, and realistic alternatives discussed. Questions answered and patient/representative(s) expressed understanding.     - Discussed: Risks, Benefits and Alternatives for BOTH SEDATION and the PROCEDURE were discussed     - Discussed with:  Patient, Spouse      - Extended Intubation/Ventilatory Support Discussed: No.      - Patient is DNR/DNI Status: No    Use of blood products discussed: No .     Postoperative Care    Pain management: Oral pain medications.   PONV prophylaxis: Ondansetron (or other 5HT-3)     Comments:                Erika Fisher MD

## 2023-06-23 NOTE — OP NOTE
Date of surgery: 6/23/2023    Surgeon: Andrea Argueta D.P.M.    Preoperative diagnosis: Bennett's neuroma left foot    Postoperative diagnosis: Same    Procedure: Excision Bennett's neuroma left foot     Anesthesia: MAC    Hemostasis: Pneumatic ankle tourniquet 250 mmHg    Specimens: Neuroma sent to pathology    Complications: None    Blood loss: None    Medications: None    Description: The patient was taken to the operating room and placed on the table in supine position. Under local anesthesia 0.5% Marcaine plain and 2% Xylocaine plain in a one-to-one mixture along with IV sedation the left foot was prepped and draped in usual aseptic manner and following exsanguination of the left foot with a Francisco's bandage the tourniquet was inflated and the following procedure was performed.      Attention was directed to the dorsal aspect of the third intermetatarsal space where a dorsal linear longitudinal skin incision was made approximately 3.0 cm in length.  This incision was deepened via sharp and blunt dissection with care being taken to identify and retract all vital structures.  Next blunt dissection was carried down to the level of the deep transverse intermetatarsal ligament.  The ligament was then transected.  Once this had been performed a large soft tissue mass was easily visible within the surgical site.  Next utilizing a tenotomy scissor this mass was resected from distal to proximal.  The wound was then inspected to ensure the entire lesion had been removed and this was deemed to be satisfactory.  The wound was then flushed with copious amounts of sterile saline solution.  Deep closure was accomplished utilizing 3-0 Monocryl suture material.  Then closed was accomplished utilizing 4-0 nylon suture material. A sterile dressing was then applied to the left foot comprising of Betadine ointment, Adaptic, 4 x 4 gauze, 3 inch Theresa and Coban.  The tourniquet was then deflated and normal color returned to all the  digits of the left foot.    The patient appeared to toleratel the procedure and anesthesia well and was transported from the operating room to the recovery room with vital signs stable and neurovascular status intact.

## 2023-06-23 NOTE — ANESTHESIA CARE TRANSFER NOTE
Patient: Liang Vázquez    Procedure: Procedure(s):  EXCISION, BENNETT'S NEUROMA left foot       Diagnosis: Bennett's neuroma of left foot [G57.62]  Diagnosis Additional Information: No value filed.    Anesthesia Type:   MAC     Note:    Oropharynx: oropharynx clear of all foreign objects  Level of Consciousness: awake  Oxygen Supplementation: room air    Independent Airway: airway patency satisfactory and stable  Dentition: dentition unchanged  Vital Signs Stable: post-procedure vital signs reviewed and stable  Report to RN Given: handoff report given  Patient transferred to: Phase II    Handoff Report: Identifed the Patient, Identified the Reponsible Provider, Reviewed the pertinent medical history, Discussed the surgical course, Reviewed Intra-OP anesthesia mangement and issues during anesthesia, Set expectations for post-procedure period and Allowed opportunity for questions and acknowledgement of understanding      Vitals:  Vitals Value Taken Time   /70 06/23/23 0726   Temp 36.5  C (97.7  F) 06/23/23 0726   Pulse 83 06/23/23 0726   Resp 14 06/23/23 0726   SpO2 95 % 06/23/23 0726       Electronically Signed By: KINZA Brown CRNA  June 23, 2023  7:28 AM

## 2023-06-23 NOTE — ANESTHESIA POSTPROCEDURE EVALUATION
Patient: Liang Vázquez    Procedure: Procedure(s):  EXCISION, MARTINEZ'S NEUROMA left foot       Anesthesia Type:  MAC    Note:  Disposition: Outpatient   Postop Pain Control: Uneventful            Sign Out: Well controlled pain   PONV: No   Neuro/Psych: Uneventful            Sign Out: Acceptable/Baseline neuro status   Airway/Respiratory: Uneventful            Sign Out: Acceptable/Baseline resp. status   CV/Hemodynamics: Uneventful            Sign Out: Acceptable CV status; No obvious hypovolemia; No obvious fluid overload   Other NRE: NONE   DID A NON-ROUTINE EVENT OCCUR? No           Last vitals:  Vitals Value Taken Time   /70 06/23/23 0745   Temp 97.7  F (36.5  C) 06/23/23 0726   Pulse 73 06/23/23 0831   Resp 16 06/23/23 0745   SpO2 96 % 06/23/23 0831   Vitals shown include unvalidated device data.    Electronically Signed By: Erika Fisher MD  June 23, 2023  11:56 AM

## 2023-07-03 ENCOUNTER — OFFICE VISIT (OUTPATIENT)
Dept: PODIATRY | Facility: CLINIC | Age: 43
End: 2023-07-03
Payer: COMMERCIAL

## 2023-07-03 VITALS
DIASTOLIC BLOOD PRESSURE: 86 MMHG | WEIGHT: 174 LBS | HEIGHT: 67 IN | HEART RATE: 76 BPM | SYSTOLIC BLOOD PRESSURE: 135 MMHG | OXYGEN SATURATION: 97 % | BODY MASS INDEX: 27.31 KG/M2

## 2023-07-03 DIAGNOSIS — G57.62 MORTON'S NEUROMA OF LEFT FOOT: Primary | ICD-10-CM

## 2023-07-03 PROCEDURE — 99024 POSTOP FOLLOW-UP VISIT: CPT | Performed by: PODIATRIST

## 2023-07-03 NOTE — Clinical Note
7/3/2023         RE: Liang Vázquez  [address]  [facility] [address]        Dear Colleague,    Thank you for referring your patient, Liang Vázquez, to the [facility]. Please see a copy of my visit note below.    Subjective findings: The patient returns to the clinic today for postop visit #1, 1 week status post excision Bennett's neuroma left foot.  The patient is in good spirits and he had no complaints.    Objective findings: The dressings were removed and all margins well coaptated and maintained.  There is no edema, erythema, cellulitis, drainage or bleeding noted.  Neurovascular status is intact.  Vital signs stable.    Assessment: Bennett's neuroma left foot    Plan: Applied a Band-Aid of the small incision.  The patient was instructed to keep the foot clean and dry for an additional week.  He is to return to the clinic in 1 week for postop visit #2 at which time the sutures will be removed.      Again, thank you for allowing me to participate in the care of your patient.        Sincerely,        Andrea Argueta DPM

## 2023-07-03 NOTE — PROGRESS NOTES
Subjective findings: The patient returns to the clinic today for postop visit #1, 1 week status post excision Bennett's neuroma left foot.  The patient is in good spirits and he had no complaints.    Objective findings: The dressings were removed and all margins well coaptated and maintained.  There is no edema, erythema, cellulitis, drainage or bleeding noted.  Neurovascular status is intact.  Vital signs stable.    Assessment: Bennett's neuroma left foot    Plan: Applied a Band-Aid of the small incision.  The patient was instructed to keep the foot clean and dry for an additional week.  He is to return to the clinic in 1 week for postop visit #2 at which time the sutures will be removed.

## 2023-07-10 ENCOUNTER — OFFICE VISIT (OUTPATIENT)
Dept: PODIATRY | Facility: CLINIC | Age: 43
End: 2023-07-10
Payer: COMMERCIAL

## 2023-07-10 VITALS — DIASTOLIC BLOOD PRESSURE: 81 MMHG | HEART RATE: 80 BPM | OXYGEN SATURATION: 96 % | SYSTOLIC BLOOD PRESSURE: 116 MMHG

## 2023-07-10 DIAGNOSIS — G57.62 MORTON'S NEUROMA OF LEFT FOOT: Primary | ICD-10-CM

## 2023-07-10 PROCEDURE — 99024 POSTOP FOLLOW-UP VISIT: CPT | Performed by: PODIATRIST

## 2023-07-10 ASSESSMENT — PAIN SCALES - GENERAL: PAINLEVEL: NO PAIN (1)

## 2023-07-10 NOTE — Clinical Note
7/10/2023         RE: Liang Vázquez  [address]  [facility] [address]        Dear Colleague,    Thank you for referring your patient, Liang Vázquez, to the [facility]. Please see a copy of my visit note below.    Subjective findings: The patient returns to the clinic today for postop visit #2, 2 weeks status post excision Bennett's neuroma left foot.  The patient is in good spirits and he had no complaints.     Objective findings: The dressings were removed and all margins well coaptated and maintained.  There is no edema, erythema, cellulitis, drainage or bleeding noted.  Neurovascular status is intact.  Vital signs stable.     Assessment: Bennett's neuroma left foot     Plan: All sutures removed today.  The patient was instructed to gradually return to normal activities.  He is to return to clinic as needed.      Again, thank you for allowing me to participate in the care of your patient.        Sincerely,        Andrea Argueta DPM

## 2023-07-10 NOTE — PROGRESS NOTES
Subjective findings: The patient returns to the clinic today for postop visit #2, 2 weeks status post excision Bennett's neuroma left foot.  The patient is in good spirits and he had no complaints.     Objective findings: The dressings were removed and all margins well coaptated and maintained.  There is no edema, erythema, cellulitis, drainage or bleeding noted.  Neurovascular status is intact.  Vital signs stable.     Assessment: Bennett's neuroma left foot     Plan: All sutures removed today.  The patient was instructed to gradually return to normal activities.  He is to return to clinic as needed.

## 2023-10-04 ENCOUNTER — MYC MEDICAL ADVICE (OUTPATIENT)
Dept: INTERNAL MEDICINE | Facility: CLINIC | Age: 43
End: 2023-10-04
Payer: COMMERCIAL

## 2023-10-04 DIAGNOSIS — K76.0 FATTY LIVER: ICD-10-CM

## 2023-10-04 DIAGNOSIS — Z00.00 ROUTINE GENERAL MEDICAL EXAMINATION AT A HEALTH CARE FACILITY: Primary | ICD-10-CM

## 2023-10-04 DIAGNOSIS — E78.2 MIXED DYSLIPIDEMIA: ICD-10-CM

## 2023-10-05 PROBLEM — E78.2 MIXED DYSLIPIDEMIA: Status: ACTIVE | Noted: 2023-10-05

## 2023-10-18 ENCOUNTER — LAB (OUTPATIENT)
Dept: LAB | Facility: CLINIC | Age: 43
End: 2023-10-18
Payer: COMMERCIAL

## 2023-10-18 DIAGNOSIS — Z00.00 ROUTINE GENERAL MEDICAL EXAMINATION AT A HEALTH CARE FACILITY: ICD-10-CM

## 2023-10-18 DIAGNOSIS — E78.2 MIXED DYSLIPIDEMIA: ICD-10-CM

## 2023-10-18 LAB
ALBUMIN SERPL BCG-MCNC: 4.9 G/DL (ref 3.5–5.2)
ALP SERPL-CCNC: 47 U/L (ref 40–129)
ALT SERPL W P-5'-P-CCNC: 71 U/L (ref 0–70)
ANION GAP SERPL CALCULATED.3IONS-SCNC: 9 MMOL/L (ref 7–15)
AST SERPL W P-5'-P-CCNC: 38 U/L (ref 0–45)
BILIRUB SERPL-MCNC: 0.7 MG/DL
BUN SERPL-MCNC: 17.2 MG/DL (ref 6–20)
CALCIUM SERPL-MCNC: 9.8 MG/DL (ref 8.6–10)
CHLORIDE SERPL-SCNC: 102 MMOL/L (ref 98–107)
CHOLEST SERPL-MCNC: 178 MG/DL
CREAT SERPL-MCNC: 1.04 MG/DL (ref 0.67–1.17)
DEPRECATED HCO3 PLAS-SCNC: 29 MMOL/L (ref 22–29)
EGFRCR SERPLBLD CKD-EPI 2021: >90 ML/MIN/1.73M2
ERYTHROCYTE [DISTWIDTH] IN BLOOD BY AUTOMATED COUNT: 11.5 % (ref 10–15)
GLUCOSE SERPL-MCNC: 93 MG/DL (ref 70–99)
HCT VFR BLD AUTO: 45.6 % (ref 40–53)
HDLC SERPL-MCNC: 42 MG/DL
HGB BLD-MCNC: 15.9 G/DL (ref 13.3–17.7)
LDLC SERPL CALC-MCNC: 114 MG/DL
MCH RBC QN AUTO: 30.6 PG (ref 26.5–33)
MCHC RBC AUTO-ENTMCNC: 34.9 G/DL (ref 31.5–36.5)
MCV RBC AUTO: 88 FL (ref 78–100)
NONHDLC SERPL-MCNC: 136 MG/DL
PLATELET # BLD AUTO: 227 10E3/UL (ref 150–450)
POTASSIUM SERPL-SCNC: 4.9 MMOL/L (ref 3.4–5.3)
PROT SERPL-MCNC: 7.6 G/DL (ref 6.4–8.3)
RBC # BLD AUTO: 5.2 10E6/UL (ref 4.4–5.9)
SODIUM SERPL-SCNC: 140 MMOL/L (ref 135–145)
TRIGL SERPL-MCNC: 112 MG/DL
TSH SERPL DL<=0.005 MIU/L-ACNC: 2.36 UIU/ML (ref 0.3–4.2)
WBC # BLD AUTO: 4.8 10E3/UL (ref 4–11)

## 2023-10-18 PROCEDURE — 85027 COMPLETE CBC AUTOMATED: CPT

## 2023-10-18 PROCEDURE — 80061 LIPID PANEL: CPT

## 2023-10-18 PROCEDURE — 80053 COMPREHEN METABOLIC PANEL: CPT

## 2023-10-18 PROCEDURE — 36415 COLL VENOUS BLD VENIPUNCTURE: CPT

## 2023-10-18 PROCEDURE — 84443 ASSAY THYROID STIM HORMONE: CPT

## 2023-11-28 DIAGNOSIS — K21.9 GASTROESOPHAGEAL REFLUX DISEASE WITHOUT ESOPHAGITIS: ICD-10-CM

## 2023-12-24 ENCOUNTER — HEALTH MAINTENANCE LETTER (OUTPATIENT)
Age: 43
End: 2023-12-24

## 2024-01-24 ENCOUNTER — OFFICE VISIT (OUTPATIENT)
Dept: INTERNAL MEDICINE | Facility: CLINIC | Age: 44
End: 2024-01-24
Payer: COMMERCIAL

## 2024-01-24 VITALS
TEMPERATURE: 97.2 F | HEIGHT: 67 IN | WEIGHT: 175.1 LBS | RESPIRATION RATE: 16 BRPM | OXYGEN SATURATION: 95 % | BODY MASS INDEX: 27.48 KG/M2 | DIASTOLIC BLOOD PRESSURE: 70 MMHG | SYSTOLIC BLOOD PRESSURE: 100 MMHG | HEART RATE: 79 BPM

## 2024-01-24 DIAGNOSIS — K21.9 GASTROESOPHAGEAL REFLUX DISEASE WITHOUT ESOPHAGITIS: ICD-10-CM

## 2024-01-24 DIAGNOSIS — Z00.00 ROUTINE GENERAL MEDICAL EXAMINATION AT A HEALTH CARE FACILITY: Primary | ICD-10-CM

## 2024-01-24 DIAGNOSIS — K76.0 FATTY LIVER: ICD-10-CM

## 2024-01-24 PROCEDURE — 99396 PREV VISIT EST AGE 40-64: CPT | Performed by: INTERNAL MEDICINE

## 2024-01-24 ASSESSMENT — ENCOUNTER SYMPTOMS
PALPITATIONS: 0
HEADACHES: 0
WEAKNESS: 0
FEVER: 0
FREQUENCY: 0
NERVOUS/ANXIOUS: 0
DYSURIA: 0
COUGH: 0
PARESTHESIAS: 0
JOINT SWELLING: 0
ARTHRALGIAS: 0
EYE PAIN: 0
SHORTNESS OF BREATH: 0
SORE THROAT: 0
HEARTBURN: 0
HEMATURIA: 0
CHILLS: 0
CONSTIPATION: 0
HEMATOCHEZIA: 0
DIARRHEA: 0
ABDOMINAL PAIN: 0
NAUSEA: 0
MYALGIAS: 0
DIZZINESS: 0

## 2024-01-24 NOTE — PROGRESS NOTES
"Preventive Care Visit  [facility]  TRAM BORJA MD, Internal Medicine  Jan 24, 2024      SUBJECTIVE:   Esteban is a 43 year old, presenting for the following:  Physical (Physical)        1/24/2024     4:49 PM   Additional Questions   Roomed by Candelaria     [unfilled]    Today's PHQ-2 Score:       1/24/2024    11:12 AM   PHQ-2 ( 1999 [employer])   Q1: Little interest or pleasure in doing things 0   Q2: Feeling down, depressed or hopeless 0   PHQ-2 Score 0   Q1: Little interest or pleasure in doing things Not at all   Q2: Feeling down, depressed or hopeless Not at all   PHQ-2 Score 0     Social History     Tobacco Use    Smoking status: Never     Passive exposure: Never    Smokeless tobacco: Never   Substance Use Topics    Alcohol use: Yes     Alcohol/week: 0.8 - 1.7 standard drinks of alcohol     Types: 1 - 2 Standard drinks or equivalent per week     Comment: occas             1/24/2024    11:11 AM   Alcohol Use   Prescreen: >3 drinks/day or >7 drinks/week? No     Last PSA: No results found for: \"PSA\"    Reviewed orders with patient. Reviewed health maintenance and updated orders accordingly - Yes  Labs reviewed in EPIC    Reviewed and updated as needed this visit by clinical staff   Tobacco  Allergies  Meds              Reviewed and updated as needed this visit by Provider     Meds                Review of Systems   Constitutional:  Negative for chills and fever.   HENT:  Negative for congestion, ear pain, hearing loss and sore throat.    Eyes:  Negative for pain and visual disturbance.   Respiratory:  Negative for cough and shortness of breath.    Cardiovascular:  Negative for chest pain and palpitations.   Gastrointestinal:  Negative for abdominal pain, constipation, diarrhea and nausea.   Genitourinary:  Negative for dysuria, frequency, genital sores, hematuria, penile discharge and urgency.   Musculoskeletal:  Negative for arthralgias, joint swelling and myalgias.   Skin:  Negative for rash. " "  Neurological:  Negative for dizziness, weakness and headaches.   Psychiatric/Behavioral:  The patient is not nervous/anxious.          OBJECTIVE:   /70   Pulse 79   Temp 97.2  F (36.2  C)   Resp 16   Ht 1.702 m (5' 7\")   Wt 79.4 kg (175 lb 1.6 oz)   SpO2 95%   BMI 27.42 kg/m     Estimated body mass index is 27.42 kg/m  as calculated from the following:    Height as of this encounter: 1.702 m (5' 7\").    Weight as of this encounter: 79.4 kg (175 lb 1.6 oz).    General: Alert, in no distress  Skin: + Scattered benign-appearing moles on his back, and mild to moderate acne  Eyes/nose/throat: Eyes without scleral icterus, eye movements normal, pupils equal and reactive, oropharynx clear, ears with normal TM's  MSK: Neck with good ROM  Lymphatic: Neck without adenopathy or masses  Endocrine: Thyroid with no nodules to palpation  Pulm: Lungs clear to auscultation bilaterally  Cardiac: Heart with regular rate and rhythm, no murmur or gallop  GI: Abdomen soft, nontender. No palpable enlargement of liver or spleen  MSK: Extremities no tenderness or edema  Neuro: Moves all extremities, without focal weakness  Psych: Alert, normal mental status. 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  In summary, he could use a little improvement on the lipid panel, but I think he could achieve this through diet, exercise, and losing a few pounds, and I do not think he needs cholesterol-lowering medication at this time.    He still has a slight elevation of liver enzyme ALT, which I can confidently attributed to fatty liver.  Investigation was done in 2021.  I reminded him of the importance of keeping alcohol to a minimum, losing a few " pounds    Left foot Bennett's neuroma surgeries successful June 2023     Successful left shoulder surgery February 16, 2022 to repair a SLAP tear and paralabral cyst     Gastroesophageal reflex  Long term omeprazole (PRILOSEC) 20 MG capsule     Reports that swallowing is not a problem.  I reminded him that the omeprazole does not actually stop the mechanism of reflux, it merely shifts the stomach contents pH to be less acidic.  I reminded him of the importance of being sure the stomach is pretty much empty before he lies down.   Therefore leave approximately 90 minutes, probably longer, after meal before lying down     Hepatic steatosis (fatty liver)   Mildly elevated ALT liver enzymes, tremor attributing to fatty liver associated with elevated body mass index  He told me that alcohol consumption is approximately couple drinks a week.    Ultrasound abdomen April 23, 2021  MPRESSION:  1.  Hepatic steatosis.    Negative test for viral hepatitis B and C April 22, 2021, slightly elevated ferritin 427 is consistent with fatty liver    10-  ALT  0 - 70 U/L 71 High        Latest Reference Range & Units 02/02/21 08:38 04/21/21 07:57 10/20/21 07:58 11/03/22 07:25   ALT 10 - 50 U/L 108 (H) 157 (H) 41 59 (H)      Overweight by body mass index criteria, 27.8, ideal would be more like 25  I reminded Esteban importance of eating slowly, controlling portion size, and identifying problem foods to reduce or even eliminate such as starches, sweets, and fried foods.  Alcoholic beverages tend to bring carbohydrate calories    Wt Readings from Last 5 Encounters:   01/24/24 79.4 kg (175 lb 1.6 oz)   07/03/23 78.9 kg (174 lb)   06/23/23 78.9 kg (174 lb)   06/07/23 79.8 kg (176 lb)   05/30/23 78.5 kg (173 lb)     Good blood pressure  BP Readings from Last 6 Encounters:   01/24/24 100/70   07/10/23 116/81   07/03/23 135/86   06/23/23 121/70   06/07/23 112/80   05/30/23 121/64     Mixed dyslipidemia with mildly elevated LDL and  "triglycerides, and low HDL     10-  LDL Cholesterol Calculated  <=100 mg/dL 114 High      Direct Measure HDL  >=40 mg/dL 42     Triglycerides  <150 mg/dL 112       Latest Reference Range & Units 11/03/22 07:25   Cholesterol <200 mg/dL 207 (H)   Glucose 70 - 99 mg/dL 97   HDL Cholesterol >=40 mg/dL 41   Hemoglobin A1C 0.0 - 5.6 % 5.2   LDL Cholesterol Calculated <=100 mg/dL 134 (H)   Non HDL Cholesterol <130 mg/dL 166 (H)   Triglycerides <150 mg/dL 159 (H)   (H): Data is abnormally high     Acne and benign-appearing moles on the back, skin tag right biceps area     Future preventive screening tests  I told Esteban that at age 45, could consider starting prostate cancer screening with annual PSA blood tests  Colon cancer screening could start at age 45.  Options include stool based test such as Cologuard, or using colonoscopy.     Vaccines  Immunization History   Administered Date(s) Administered    COVID-19 Bivalent 12+ (Pfizer) 10/26/2022    COVID-19 MONOVALENT 12+ (Pfizer) 03/18/2021, 04/08/2021, 10/21/2021    Influenza Vaccine >6 months,quad, PF 10/26/2022    TDAP (Adacel,Boostrix) 02/02/2021     Counseling  Reviewed preventive health counseling, as reflected in patient instructions       Healthy diet/nutrition      BMI  Estimated body mass index is 27.42 kg/m  as calculated from the following:    Height as of this encounter: 1.702 m (5' 7\").    Weight as of this encounter: 79.4 kg (175 lb 1.6 oz).   Weight management plan: Discussed healthy diet and exercise guidelines      He reports that he has never smoked. He has never been exposed to tobacco smoke. He has never used smokeless tobacco.      Signed Electronically by: TRAM BORJA MD  Answers submitted by the patient for this visit:  Annual Preventive Visit (Submitted on 1/24/2024)  Chief Complaint: Annual Exam:  Blood in stool: No  heartburn: No  peripheral edema: No  mood changes: No  Skin sensation changes: No  impotence: No    "

## 2024-01-24 NOTE — PATIENT INSTRUCTIONS
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  In summary, he could use a little improvement on the lipid panel, but I think he could achieve this through diet, exercise, and losing a few pounds, and I do not think he needs cholesterol-lowering medication at this time.    He still has a slight elevation of liver enzyme ALT, which I can confidently attributed to fatty liver.  Investigation was done in 2021.  I reminded him of the importance of keeping alcohol to a minimum, losing a few pounds    Left foot Bennett's neuroma surgeries successful June 2023     Successful left shoulder surgery February 16, 2022 to repair a SLAP tear and paralabral cyst     Gastroesophageal reflex  Long term omeprazole (PRILOSEC) 20 MG capsule     Reports that swallowing is not a problem.  I reminded him that the omeprazole does not actually stop the mechanism of reflux, it merely shifts the stomach contents pH to be less acidic.  I reminded him of the importance of being sure the stomach is pretty much empty before he lies down.   Therefore leave approximately 90 minutes, probably longer, after meal before lying down     Hepatic steatosis (fatty liver)   Mildly elevated ALT liver enzymes, tremor attributing to fatty liver associated with elevated body mass index  He told me that alcohol consumption is approximately couple drinks a week.    Ultrasound abdomen April 23, 2021  MPRESSION:  1.  Hepatic steatosis.    Negative test for viral hepatitis B and C April 22, 2021, slightly elevated ferritin 427 is consistent with fatty liver    10-  ALT  0 - 70 U/L 71 High        Latest Reference Range & Units 02/02/21 08:38 04/21/21 07:57 10/20/21 07:58 11/03/22 07:25   ALT 10 - 50 U/L 108 (H) 157 (H) 41 59 (H)       Overweight by body mass index criteria, 27.8, ideal would be more like 25  I reminded Esteban importance of eating slowly, controlling portion size, and identifying problem foods to reduce or even eliminate such as starches, sweets, and fried foods.  Alcoholic beverages tend to bring carbohydrate calories    Wt Readings from Last 5 Encounters:   01/24/24 79.4 kg (175 lb 1.6 oz)   07/03/23 78.9 kg (174 lb)   06/23/23 78.9 kg (174 lb)   06/07/23 79.8 kg (176 lb)   05/30/23 78.5 kg (173 lb)     Good blood pressure  BP Readings from Last 6 Encounters:   01/24/24 100/70   07/10/23 116/81   07/03/23 135/86   06/23/23 121/70   06/07/23 112/80   05/30/23 121/64     Mixed dyslipidemia with mildly elevated LDL and triglycerides, and low HDL     10-  LDL Cholesterol Calculated  <=100 mg/dL 114 High      Direct Measure HDL  >=40 mg/dL 42     Triglycerides  <150 mg/dL 112       Latest Reference Range & Units 11/03/22 07:25   Cholesterol <200 mg/dL 207 (H)   Glucose 70 - 99 mg/dL 97   HDL Cholesterol >=40 mg/dL 41   Hemoglobin A1C 0.0 - 5.6 % 5.2   LDL Cholesterol Calculated <=100 mg/dL 134 (H)   Non HDL Cholesterol <130 mg/dL 166 (H)   Triglycerides <150 mg/dL 159 (H)   (H): Data is abnormally high     Acne and benign-appearing moles on the back, skin tag right biceps area     Future preventive screening tests  I told Esteban that at age 45, could consider starting prostate cancer screening with annual PSA blood tests  Colon cancer screening could start at age 45.  Options include stool based test such as Cologuard, or using colonoscopy.     Vaccines  Immunization History   Administered Date(s) Administered    COVID-19 Bivalent 12+ (Pfizer) 10/26/2022    COVID-19 MONOVALENT 12+ (Pfizer) 03/18/2021, 04/08/2021, 10/21/2021    Influenza Vaccine >6 months,quad, PF 10/26/2022    TDAP (Adacel,Boostrix) 02/02/2021

## 2024-06-06 DIAGNOSIS — K21.9 GASTROESOPHAGEAL REFLUX DISEASE WITHOUT ESOPHAGITIS: ICD-10-CM

## 2025-01-03 ENCOUNTER — TRANSFERRED RECORDS (OUTPATIENT)
Dept: HEALTH INFORMATION MANAGEMENT | Facility: CLINIC | Age: 45
End: 2025-01-03
Payer: COMMERCIAL

## 2025-01-29 ENCOUNTER — MYC MEDICAL ADVICE (OUTPATIENT)
Dept: INTERNAL MEDICINE | Facility: CLINIC | Age: 45
End: 2025-01-29
Payer: COMMERCIAL

## 2025-02-05 ENCOUNTER — LAB (OUTPATIENT)
Dept: LAB | Facility: CLINIC | Age: 45
End: 2025-02-05
Payer: COMMERCIAL

## 2025-02-05 DIAGNOSIS — Z12.5 SCREENING PSA (PROSTATE SPECIFIC ANTIGEN): ICD-10-CM

## 2025-02-05 DIAGNOSIS — E78.2 MIXED DYSLIPIDEMIA: ICD-10-CM

## 2025-02-05 DIAGNOSIS — Z00.00 ROUTINE GENERAL MEDICAL EXAMINATION AT A HEALTH CARE FACILITY: ICD-10-CM

## 2025-02-05 LAB
ALBUMIN SERPL BCG-MCNC: 4.4 G/DL (ref 3.5–5.2)
ALP SERPL-CCNC: 64 U/L (ref 40–150)
ALT SERPL W P-5'-P-CCNC: 61 U/L (ref 0–70)
ANION GAP SERPL CALCULATED.3IONS-SCNC: 11 MMOL/L (ref 7–15)
AST SERPL W P-5'-P-CCNC: 30 U/L (ref 0–45)
BILIRUB SERPL-MCNC: 0.5 MG/DL
BUN SERPL-MCNC: 15 MG/DL (ref 6–20)
CALCIUM SERPL-MCNC: 9.2 MG/DL (ref 8.8–10.4)
CHLORIDE SERPL-SCNC: 104 MMOL/L (ref 98–107)
CHOLEST SERPL-MCNC: 191 MG/DL
CREAT SERPL-MCNC: 1.04 MG/DL (ref 0.67–1.17)
EGFRCR SERPLBLD CKD-EPI 2021: >90 ML/MIN/1.73M2
ERYTHROCYTE [DISTWIDTH] IN BLOOD BY AUTOMATED COUNT: 11.8 % (ref 10–15)
EST. AVERAGE GLUCOSE BLD GHB EST-MCNC: 108 MG/DL
FASTING STATUS PATIENT QL REPORTED: YES
FASTING STATUS PATIENT QL REPORTED: YES
GLUCOSE SERPL-MCNC: 98 MG/DL (ref 70–99)
HBA1C MFR BLD: 5.4 % (ref 0–5.6)
HCO3 SERPL-SCNC: 26 MMOL/L (ref 22–29)
HCT VFR BLD AUTO: 46 % (ref 40–53)
HDLC SERPL-MCNC: 44 MG/DL
HGB BLD-MCNC: 16 G/DL (ref 13.3–17.7)
LDLC SERPL CALC-MCNC: 125 MG/DL
MCH RBC QN AUTO: 30.8 PG (ref 26.5–33)
MCHC RBC AUTO-ENTMCNC: 34.8 G/DL (ref 31.5–36.5)
MCV RBC AUTO: 89 FL (ref 78–100)
NONHDLC SERPL-MCNC: 147 MG/DL
PLATELET # BLD AUTO: 197 10E3/UL (ref 150–450)
POTASSIUM SERPL-SCNC: 4.1 MMOL/L (ref 3.4–5.3)
PROT SERPL-MCNC: 7.5 G/DL (ref 6.4–8.3)
PSA SERPL DL<=0.01 NG/ML-MCNC: 0.42 NG/ML (ref 0–2.5)
RBC # BLD AUTO: 5.2 10E6/UL (ref 4.4–5.9)
SODIUM SERPL-SCNC: 141 MMOL/L (ref 135–145)
TRIGL SERPL-MCNC: 111 MG/DL
WBC # BLD AUTO: 5.5 10E3/UL (ref 4–11)

## 2025-02-05 PROCEDURE — 80053 COMPREHEN METABOLIC PANEL: CPT

## 2025-02-05 PROCEDURE — G0103 PSA SCREENING: HCPCS

## 2025-02-05 PROCEDURE — 83036 HEMOGLOBIN GLYCOSYLATED A1C: CPT

## 2025-02-05 PROCEDURE — 85027 COMPLETE CBC AUTOMATED: CPT

## 2025-02-05 PROCEDURE — 80061 LIPID PANEL: CPT

## 2025-02-05 PROCEDURE — 36415 COLL VENOUS BLD VENIPUNCTURE: CPT

## 2025-03-31 ENCOUNTER — TRANSFERRED RECORDS (OUTPATIENT)
Dept: HEALTH INFORMATION MANAGEMENT | Facility: CLINIC | Age: 45
End: 2025-03-31
Payer: COMMERCIAL

## 2025-06-18 ENCOUNTER — OFFICE VISIT (OUTPATIENT)
Dept: INTERNAL MEDICINE | Facility: CLINIC | Age: 45
End: 2025-06-18
Payer: COMMERCIAL

## 2025-06-18 ENCOUNTER — ANCILLARY PROCEDURE (OUTPATIENT)
Dept: GENERAL RADIOLOGY | Facility: CLINIC | Age: 45
End: 2025-06-18
Attending: INTERNAL MEDICINE
Payer: COMMERCIAL

## 2025-06-18 VITALS
HEIGHT: 67 IN | HEART RATE: 80 BPM | RESPIRATION RATE: 16 BRPM | WEIGHT: 184 LBS | TEMPERATURE: 98.4 F | DIASTOLIC BLOOD PRESSURE: 82 MMHG | OXYGEN SATURATION: 97 % | SYSTOLIC BLOOD PRESSURE: 128 MMHG | BODY MASS INDEX: 28.88 KG/M2

## 2025-06-18 DIAGNOSIS — J20.9 ACUTE BRONCHITIS WITH SYMPTOMS > 10 DAYS: Primary | ICD-10-CM

## 2025-06-18 DIAGNOSIS — J20.9 ACUTE BRONCHITIS WITH SYMPTOMS > 10 DAYS: ICD-10-CM

## 2025-06-18 PROCEDURE — 99213 OFFICE O/P EST LOW 20 MIN: CPT | Performed by: INTERNAL MEDICINE

## 2025-06-18 PROCEDURE — 71046 X-RAY EXAM CHEST 2 VIEWS: CPT | Mod: TC | Performed by: RADIOLOGY

## 2025-06-18 PROCEDURE — G2211 COMPLEX E/M VISIT ADD ON: HCPCS | Performed by: INTERNAL MEDICINE

## 2025-06-18 RX ORDER — AZITHROMYCIN 250 MG/1
TABLET, FILM COATED ORAL
Qty: 6 TABLET | Refills: 0 | Status: SHIPPED | OUTPATIENT
Start: 2025-06-18 | End: 2025-06-23

## 2025-06-18 NOTE — PROGRESS NOTES
"Office Visit - Follow Up   Liang Vázquez   44 year old male    Date of Visit: 6/18/2025    Chief Complaint   Patient presents with    Cough        -------------------------------------------------------------------------------------------------------------------------  Assessment and Plan    Acute illness visit  Esteban comes to clinic this morning of June 18, 2025 because of    Respiratory infection syndrome has been going on about 3 weeks    Esteban told me that he has had a productive cough, and a pressure sensation across the chest associated with respiratory effort, and reduced stamina.  He does not recall any sick contacts at home or at work that seemed obvious to him.  He so far has not run a home COVID test, but given that his symptoms have been going on for 3 weeks, is a little late for that anyway because antibiotic COVID viral pills have to be started within 5 days.    On examination his oxygen saturation is still good at 97%, vital signs normal, including temperature.  Lungs sound clear, no wheezes or rales heard.  Heart rhythm regular.  Oropharynx clear.  Ear canals clear.    Given that Esteban's symptoms have been going on for 3 weeks, lets get a chest x-ray  I want to treat Esteban empirically with a course of antibiotics in the form of a 5-day Z-Rolando (azithromycin).  I told  Esteban that azithromycin covers most common community-acquired bacterial respiratory pathogens, including atypical organisms like mycoplasma, which is a common cause of \"walking pneumonia syndrome.\"    I told Greyson use over-the-counter cough remedies like Robitussin with dextromethorphan.  Stay well-hydrated.     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  I reminded Julien to take the doxycycline with a number feet, so that is less irritating to the stomach.     Right shoulder rotator cuff tear demonstrated by MRI spring 2025  doing PT for his right shoulder, at [place] as of January 2025  Most recent injection March 2025, partial relief, continues with conservative management, hoping to avoid surgery     Successful left shoulder surgery February 16, 2022 to repair a SLAP tear and paralabral cyst  Left foot Bennett's neuroma surgeries successful June 2023     Gastroesophageal reflex  Long term omeprazole (PRILOSEC) 20 MG capsule     Reports that swallowing is not a problem.  I reminded him that the omeprazole does not actually stop the mechanism of reflux, it merely shifts the stomach contents pH to be less acidic.  I reminded him of the importance of being sure the stomach is pretty much empty before he lies down.   Therefore leave approximately 90 minutes, probably longer, after meal before lying down     Hepatic steatosis (fatty liver)   Mildly elevated ALT liver enzymes, tremor attributing to fatty liver associated with elevated body mass index  He told me that alcohol consumption is approximately couple drinks a week.     Ultrasound abdomen April 23, 2021  MPRESSION:  1.  Hepatic steatosis.     Negative test for viral hepatitis B and C April 22, 2021, slightly elevated ferritin 427 is consistent with fatty liver     2-5-2025  ALT  0 - 70 U/L 61     AST  0 - 45 U/L 30     Alkaline Phosphatase 40 - 150 U/L 64     10-  ALT  0 - 70 U/L 71 High         Latest Reference Range & Units 02/02/21 08:38 04/21/21 07:57 10/20/21 07:58 11/03/22 07:25   ALT 10 - 50 U/L 108 (H) 157 (H) 41 59 (H)      Overweight by body mass index criteria, 28, ideal would be more like 25  I reminded Esteban importance of eating slowly, controlling portion size, and identifying problem foods to reduce or even  eliminate such as starches, sweets, and fried foods.  Alcoholic beverages tend to bring carbohydrate calories     Wt Readings from Last 5 Encounters:   06/18/25 83.5 kg (184 lb)   01/31/25 79.8 kg (176 lb)   01/24/24 79.4 kg (175 lb 1.6 oz)   07/03/23 78.9 kg (174 lb)   06/23/23 78.9 kg (174 lb)     Mixed dyslipidemia with mildly elevated LDL and triglycerides, and low HDL  2-5-2025  LDL Cholesterol Calculated  <100 mg/dL 125 High      Direct Measure HDL  >=40 mg/dL 44     Triglycerides  <150 mg/dL 111     Acne and benign-appearing moles on the back, skin tag right biceps area     Future preventive screening tests  Screen PSA  Prostate Specific Antigen Screen  0.00 - 2.50 ng/mL 0.42     I told Esteban that at age 45, could consider starting prostate cancer screening with annual PSA blood tests  Colon cancer screening could start at age 45.  Options include stool based test such as Cologuard, or using colonoscopy.     Vaccines  Immunization History   Administered Date(s) Administered    COVID-19 12+ (Pfizer) 01/31/2025    COVID-19 Bivalent 12+ (Pfizer) 10/26/2022    COVID-19 MONOVALENT 12+ (Pfizer) 03/18/2021, 04/08/2021, 10/21/2021    Influenza Vaccine >6 months,quad, PF 10/26/2022    TDAP (Adacel,Boostrix) 02/02/2021         --------------------------------------------------------------------------------------------------------------------------  History of Present Illness  This 44 year old old       Cough      6/18/2025    10:48 AM   Additional Questions   Roomed by Shawnee Thorne    History of Present Illness       Reason for visit:  Chest feels heavy when breathing,   slight cough  Symptoms include:  Chest feels heavy when breathing,   slight cough  Symptom intensity:  Mild  Symptom progression:  Staying the same  Had these symptoms before:  No He is missing 2 dose(s) of medications per week.  He is not taking prescribed medications regularly due to remembering to take.        Wt Readings from Last 3 Encounters:  "  06/18/25 83.5 kg (184 lb)   01/31/25 79.8 kg (176 lb)   01/24/24 79.4 kg (175 lb 1.6 oz)     BP Readings from Last 3 Encounters:   06/18/25 128/82   01/31/25 114/76   01/24/24 100/70     ---------------------------------------------------------------------------------------------------------------------------    Medications, Allergies, Social, and Problem List     Current Outpatient Medications   Medication Sig Dispense Refill    doxycycline hyclate (VIBRAMYCIN) 100 MG capsule Take 1 capsule (100 mg) by mouth 2 times daily. Take for 7 days in case of acne flareup, okay to repeat as needed 60 capsule 3    omeprazole (PRILOSEC) 20 MG DR capsule Take 1 capsule (20 mg) by mouth daily. 90 capsule 3     No Known Allergies  Social History     Tobacco Use    Smoking status: Never     Passive exposure: Never    Smokeless tobacco: Never   Vaping Use    Vaping status: Never Used   Substance Use Topics    Alcohol use: Yes     Alcohol/week: 0.8 - 1.7 standard drinks of alcohol     Types: 1 - 2 Standard drinks or equivalent per week     Comment: occas    Drug use: No     Patient Active Problem List   Diagnosis    Esophageal Reflux    SLAP tear of shoulder    Abnormal LFTs (liver function tests)    Fatty liver-- ultrasound 4-    Bennett's neuroma of left foot    Mixed dyslipidemia        Reviewed, reconciled and updated       Physical Exam   General Appearance:       /82 (BP Location: Right arm, Patient Position: Sitting, Cuff Size: Adult Regular)   Pulse 80   Temp 98.4  F (36.9  C)   Resp 16   Ht 1.689 m (5' 6.5\")   Wt 83.5 kg (184 lb)   SpO2 97%   BMI 29.25 kg/m      On examination his oxygen saturation is still good at 97%, vital signs normal, including temperature.  Lungs sound clear, no wheezes or rales heard.  Heart rhythm regular.  Oropharynx clear.  Ear canals clear.     Additional Information   I spent 20 minutes on this encounter, including reviewing interval history since last visit, examining the " patient, explaining and counseling the issues enumerated in the Assessment and Plan (patient given a copy), ordering indicated tests, ordering prescriptions    The longitudinal plan of care for the diagnosis(es)/condition(s) as documented were addressed during this visit. Due to the added complexity in care, I will continue to support Esteban in the subsequent management and with ongoing continuity of care.       TRAM BORJA MD, MD    Signed Electronically by: TRAM BORJA MD

## 2025-06-18 NOTE — PATIENT INSTRUCTIONS
"Acute illness visit  Esteban comes to clinic this morning of June 18, 2025 because of    Respiratory infection syndrome has been going on about 3 weeks    Esteban told me that he has had a productive cough, and a pressure sensation across the chest associated with respiratory effort, and reduced stamina.  He does not recall any sick contacts at home or at work that seemed obvious to him.  He so far has not run a home COVID test, but given that his symptoms have been going on for 3 weeks, is a little late for that anyway because antibiotic COVID viral pills have to be started within 5 days.    On examination his oxygen saturation is still good at 97%, vital signs normal, including temperature.  Lungs sound clear, no wheezes or rales heard.  Heart rhythm regular.  Oropharynx clear.  Ear canals clear.    Given that Esteban's symptoms have been going on for 3 weeks, lets get a chest x-ray  I want to treat Esteban empirically with a course of antibiotics in the form of a 5-day Z-Rolando (azithromycin).  I told  Esteban that azithromycin covers most common community-acquired bacterial respiratory pathogens, including atypical organisms like mycoplasma, which is a common cause of \"walking pneumonia syndrome.\"    I told Greyson use over-the-counter cough remedies like Robitussin with dextromethorphan.  Stay well-hydrated.     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  I reminded Julien to take the doxycycline with a number feet, so that is less irritating to the stomach.     Right shoulder rotator cuff tear demonstrated by MRI spring 2025  doing PT for his right shoulder, at " [place] as of January 2025  Most recent injection March 2025, partial relief, continues with conservative management, hoping to avoid surgery     Successful left shoulder surgery February 16, 2022 to repair a SLAP tear and paralabral cyst  Left foot Bennett's neuroma surgeries successful June 2023     Gastroesophageal reflex  Long term omeprazole (PRILOSEC) 20 MG capsule     Reports that swallowing is not a problem.  I reminded him that the omeprazole does not actually stop the mechanism of reflux, it merely shifts the stomach contents pH to be less acidic.  I reminded him of the importance of being sure the stomach is pretty much empty before he lies down.   Therefore leave approximately 90 minutes, probably longer, after meal before lying down     Hepatic steatosis (fatty liver)   Mildly elevated ALT liver enzymes, tremor attributing to fatty liver associated with elevated body mass index  He told me that alcohol consumption is approximately couple drinks a week.     Ultrasound abdomen April 23, 2021  MPRESSION:  1.  Hepatic steatosis.     Negative test for viral hepatitis B and C April 22, 2021, slightly elevated ferritin 427 is consistent with fatty liver     2-5-2025  ALT  0 - 70 U/L 61     AST  0 - 45 U/L 30     Alkaline Phosphatase 40 - 150 U/L 64     10-  ALT  0 - 70 U/L 71 High         Latest Reference Range & Units 02/02/21 08:38 04/21/21 07:57 10/20/21 07:58 11/03/22 07:25   ALT 10 - 50 U/L 108 (H) 157 (H) 41 59 (H)      Overweight by body mass index criteria, 28, ideal would be more like 25  I reminded Esteban importance of eating slowly, controlling portion size, and identifying problem foods to reduce or even eliminate such as starches, sweets, and fried foods.  Alcoholic beverages tend to bring carbohydrate calories     Wt Readings from Last 5 Encounters:   06/18/25 83.5 kg (184 lb)   01/31/25 79.8 kg (176 lb)   01/24/24 79.4 kg (175 lb 1.6 oz)   07/03/23 78.9 kg (174 lb)   06/23/23 78.9 kg (174  lb)     Mixed dyslipidemia with mildly elevated LDL and triglycerides, and low HDL  2-5-2025  LDL Cholesterol Calculated  <100 mg/dL 125 High      Direct Measure HDL  >=40 mg/dL 44     Triglycerides  <150 mg/dL 111     Acne and benign-appearing moles on the back, skin tag right biceps area     Future preventive screening tests  Screen PSA  Prostate Specific Antigen Screen  0.00 - 2.50 ng/mL 0.42     I told Esteban that at age 45, could consider starting prostate cancer screening with annual PSA blood tests  Colon cancer screening could start at age 45.  Options include stool based test such as Cologuard, or using colonoscopy.     Vaccines  Immunization History   Administered Date(s) Administered    COVID-19 12+ (Pfizer) 01/31/2025    COVID-19 Bivalent 12+ (Pfizer) 10/26/2022    COVID-19 MONOVALENT 12+ (Pfizer) 03/18/2021, 04/08/2021, 10/21/2021    Influenza Vaccine >6 months,quad, PF 10/26/2022    TDAP (Adacel,Boostrix) 02/02/2021

## 2025-06-19 ENCOUNTER — RESULTS FOLLOW-UP (OUTPATIENT)
Dept: INTERNAL MEDICINE | Facility: CLINIC | Age: 45
End: 2025-06-19